# Patient Record
Sex: FEMALE | Race: WHITE | NOT HISPANIC OR LATINO | Employment: OTHER | URBAN - METROPOLITAN AREA
[De-identification: names, ages, dates, MRNs, and addresses within clinical notes are randomized per-mention and may not be internally consistent; named-entity substitution may affect disease eponyms.]

---

## 2021-05-23 ENCOUNTER — HOSPITAL ENCOUNTER (EMERGENCY)
Facility: HOSPITAL | Age: 56
Discharge: HOME | End: 2021-05-23
Attending: EMERGENCY MEDICINE | Admitting: EMERGENCY MEDICINE

## 2021-05-23 ENCOUNTER — APPOINTMENT (OUTPATIENT)
Dept: CT IMAGING | Facility: HOSPITAL | Age: 56
End: 2021-05-23

## 2021-05-23 VITALS
SYSTOLIC BLOOD PRESSURE: 134 MMHG | HEART RATE: 95 BPM | WEIGHT: 185 LBS | HEIGHT: 67 IN | TEMPERATURE: 98.8 F | BODY MASS INDEX: 29.03 KG/M2 | OXYGEN SATURATION: 96 % | RESPIRATION RATE: 16 BRPM | DIASTOLIC BLOOD PRESSURE: 66 MMHG

## 2021-05-23 DIAGNOSIS — K59.00 CONSTIPATION, UNSPECIFIED CONSTIPATION TYPE: Primary | ICD-10-CM

## 2021-05-23 DIAGNOSIS — K92.1 HEMATOCHEZIA: ICD-10-CM

## 2021-05-23 LAB
ABO GROUP BLD: NORMAL
ALBUMIN SERPL-MCNC: 4.4 G/DL (ref 3.5–5.2)
ALBUMIN/GLOB SERPL: 1.6 G/DL
ALP SERPL-CCNC: 76 U/L (ref 39–117)
ALT SERPL W P-5'-P-CCNC: 9 U/L (ref 1–33)
ANION GAP SERPL CALCULATED.3IONS-SCNC: 10.5 MMOL/L (ref 5–15)
AST SERPL-CCNC: 15 U/L (ref 1–32)
BACTERIA UR QL AUTO: ABNORMAL /HPF
BASOPHILS # BLD AUTO: 0.05 10*3/MM3 (ref 0–0.2)
BASOPHILS NFR BLD AUTO: 0.5 % (ref 0–1.5)
BILIRUB SERPL-MCNC: 0.2 MG/DL (ref 0–1.2)
BILIRUB UR QL STRIP: NEGATIVE
BLD GP AB SCN SERPL QL: NEGATIVE
BUN SERPL-MCNC: 24 MG/DL (ref 6–20)
BUN/CREAT SERPL: 15.6 (ref 7–25)
CALCIUM SPEC-SCNC: 9.3 MG/DL (ref 8.6–10.5)
CHLORIDE SERPL-SCNC: 103 MMOL/L (ref 98–107)
CLARITY UR: ABNORMAL
CO2 SERPL-SCNC: 22.5 MMOL/L (ref 22–29)
COLOR UR: YELLOW
CREAT SERPL-MCNC: 1.54 MG/DL (ref 0.57–1)
DEPRECATED RDW RBC AUTO: 46.1 FL (ref 37–54)
EOSINOPHIL # BLD AUTO: 0.34 10*3/MM3 (ref 0–0.4)
EOSINOPHIL NFR BLD AUTO: 3.1 % (ref 0.3–6.2)
ERYTHROCYTE [DISTWIDTH] IN BLOOD BY AUTOMATED COUNT: 13.1 % (ref 12.3–15.4)
GFR SERPL CREATININE-BSD FRML MDRD: 35 ML/MIN/1.73
GFR SERPL CREATININE-BSD FRML MDRD: 42 ML/MIN/1.73
GLOBULIN UR ELPH-MCNC: 2.8 GM/DL
GLUCOSE SERPL-MCNC: 148 MG/DL (ref 65–99)
GLUCOSE UR STRIP-MCNC: NEGATIVE MG/DL
HCT VFR BLD AUTO: 41.2 % (ref 34–46.6)
HGB BLD-MCNC: 13.7 G/DL (ref 12–15.9)
HGB UR QL STRIP.AUTO: ABNORMAL
HYALINE CASTS UR QL AUTO: ABNORMAL /LPF
IMM GRANULOCYTES # BLD AUTO: 0.05 10*3/MM3 (ref 0–0.05)
IMM GRANULOCYTES NFR BLD AUTO: 0.5 % (ref 0–0.5)
INR PPP: 0.94 (ref 0.9–1.1)
KETONES UR QL STRIP: NEGATIVE
LEUKOCYTE ESTERASE UR QL STRIP.AUTO: ABNORMAL
LIPASE SERPL-CCNC: 49 U/L (ref 13–60)
LYMPHOCYTES # BLD AUTO: 1.98 10*3/MM3 (ref 0.7–3.1)
LYMPHOCYTES NFR BLD AUTO: 18.3 % (ref 19.6–45.3)
MCH RBC QN AUTO: 31.9 PG (ref 26.6–33)
MCHC RBC AUTO-ENTMCNC: 33.3 G/DL (ref 31.5–35.7)
MCV RBC AUTO: 95.8 FL (ref 79–97)
MONOCYTES # BLD AUTO: 0.68 10*3/MM3 (ref 0.1–0.9)
MONOCYTES NFR BLD AUTO: 6.3 % (ref 5–12)
NEUTROPHILS NFR BLD AUTO: 7.74 10*3/MM3 (ref 1.7–7)
NEUTROPHILS NFR BLD AUTO: 71.3 % (ref 42.7–76)
NITRITE UR QL STRIP: NEGATIVE
NRBC BLD AUTO-RTO: 0 /100 WBC (ref 0–0.2)
PH UR STRIP.AUTO: <=5 [PH] (ref 5–8)
PLATELET # BLD AUTO: 280 10*3/MM3 (ref 140–450)
PMV BLD AUTO: 10.4 FL (ref 6–12)
POTASSIUM SERPL-SCNC: 4.8 MMOL/L (ref 3.5–5.2)
PROT SERPL-MCNC: 7.2 G/DL (ref 6–8.5)
PROT UR QL STRIP: NEGATIVE
PROTHROMBIN TIME: 12.4 SECONDS (ref 11.7–14.2)
RBC # BLD AUTO: 4.3 10*6/MM3 (ref 3.77–5.28)
RBC # UR: ABNORMAL /HPF
REF LAB TEST METHOD: ABNORMAL
RH BLD: POSITIVE
SODIUM SERPL-SCNC: 136 MMOL/L (ref 136–145)
SP GR UR STRIP: 1.01 (ref 1–1.03)
SQUAMOUS #/AREA URNS HPF: ABNORMAL /HPF
T&S EXPIRATION DATE: NORMAL
UROBILINOGEN UR QL STRIP: ABNORMAL
WBC # BLD AUTO: 10.84 10*3/MM3 (ref 3.4–10.8)
WBC UR QL AUTO: ABNORMAL /HPF

## 2021-05-23 PROCEDURE — 96375 TX/PRO/DX INJ NEW DRUG ADDON: CPT

## 2021-05-23 PROCEDURE — 85610 PROTHROMBIN TIME: CPT | Performed by: EMERGENCY MEDICINE

## 2021-05-23 PROCEDURE — 86850 RBC ANTIBODY SCREEN: CPT | Performed by: EMERGENCY MEDICINE

## 2021-05-23 PROCEDURE — 25010000002 IOPAMIDOL 61 % SOLUTION: Performed by: EMERGENCY MEDICINE

## 2021-05-23 PROCEDURE — 86900 BLOOD TYPING SEROLOGIC ABO: CPT | Performed by: EMERGENCY MEDICINE

## 2021-05-23 PROCEDURE — 25010000002 HYDROMORPHONE PER 4 MG: Performed by: EMERGENCY MEDICINE

## 2021-05-23 PROCEDURE — 96374 THER/PROPH/DIAG INJ IV PUSH: CPT

## 2021-05-23 PROCEDURE — 85025 COMPLETE CBC W/AUTO DIFF WBC: CPT | Performed by: EMERGENCY MEDICINE

## 2021-05-23 PROCEDURE — 74177 CT ABD & PELVIS W/CONTRAST: CPT

## 2021-05-23 PROCEDURE — 80053 COMPREHEN METABOLIC PANEL: CPT | Performed by: EMERGENCY MEDICINE

## 2021-05-23 PROCEDURE — 99284 EMERGENCY DEPT VISIT MOD MDM: CPT

## 2021-05-23 PROCEDURE — 81001 URINALYSIS AUTO W/SCOPE: CPT | Performed by: EMERGENCY MEDICINE

## 2021-05-23 PROCEDURE — 25010000002 ONDANSETRON PER 1 MG: Performed by: EMERGENCY MEDICINE

## 2021-05-23 PROCEDURE — 83690 ASSAY OF LIPASE: CPT | Performed by: EMERGENCY MEDICINE

## 2021-05-23 PROCEDURE — 86901 BLOOD TYPING SEROLOGIC RH(D): CPT | Performed by: EMERGENCY MEDICINE

## 2021-05-23 RX ORDER — HYDROMORPHONE HYDROCHLORIDE 1 MG/ML
0.5 INJECTION, SOLUTION INTRAMUSCULAR; INTRAVENOUS; SUBCUTANEOUS ONCE
Status: COMPLETED | OUTPATIENT
Start: 2021-05-23 | End: 2021-05-23

## 2021-05-23 RX ORDER — ONDANSETRON 2 MG/ML
4 INJECTION INTRAMUSCULAR; INTRAVENOUS ONCE
Status: COMPLETED | OUTPATIENT
Start: 2021-05-23 | End: 2021-05-23

## 2021-05-23 RX ORDER — SODIUM CHLORIDE 0.9 % (FLUSH) 0.9 %
10 SYRINGE (ML) INJECTION AS NEEDED
Status: DISCONTINUED | OUTPATIENT
Start: 2021-05-23 | End: 2021-05-23 | Stop reason: HOSPADM

## 2021-05-23 RX ADMIN — ONDANSETRON 4 MG: 2 INJECTION INTRAMUSCULAR; INTRAVENOUS at 18:48

## 2021-05-23 RX ADMIN — IOPAMIDOL 85 ML: 612 INJECTION, SOLUTION INTRAVENOUS at 19:55

## 2021-05-23 RX ADMIN — HYDROMORPHONE HYDROCHLORIDE 0.5 MG: 1 INJECTION, SOLUTION INTRAMUSCULAR; INTRAVENOUS; SUBCUTANEOUS at 18:48

## 2021-05-23 RX ADMIN — SODIUM CHLORIDE, POTASSIUM CHLORIDE, SODIUM LACTATE AND CALCIUM CHLORIDE 1000 ML: 600; 310; 30; 20 INJECTION, SOLUTION INTRAVENOUS at 18:48

## 2021-05-23 NOTE — ED NOTES
This RN wore mask, gloves, and protective eyewear throughout patient encounter. Pt wearing mask at all times. Hand hygiene performed before and after entering room.        Penny Deng, RN  05/23/21 9240

## 2022-04-18 ENCOUNTER — TRANSCRIBE ORDERS (OUTPATIENT)
Dept: ADMINISTRATIVE | Facility: HOSPITAL | Age: 57
End: 2022-04-18

## 2022-04-18 DIAGNOSIS — N25.9 IMPAIRED RENAL FUNCTION DISORDER: Primary | ICD-10-CM

## 2022-04-20 ENCOUNTER — HOSPITAL ENCOUNTER (OUTPATIENT)
Dept: ULTRASOUND IMAGING | Facility: HOSPITAL | Age: 57
Discharge: HOME | End: 2022-04-20
Admitting: NURSE PRACTITIONER

## 2022-04-20 DIAGNOSIS — N25.9 IMPAIRED RENAL FUNCTION DISORDER: ICD-10-CM

## 2022-04-20 PROCEDURE — 76775 US EXAM ABDO BACK WALL LIM: CPT

## 2022-04-25 ENCOUNTER — OFFICE VISIT (OUTPATIENT)
Dept: GASTROENTEROLOGY | Facility: CLINIC | Age: 57
End: 2022-04-25

## 2022-04-25 VITALS
BODY MASS INDEX: 31.55 KG/M2 | WEIGHT: 201 LBS | HEART RATE: 80 BPM | HEIGHT: 67 IN | DIASTOLIC BLOOD PRESSURE: 66 MMHG | TEMPERATURE: 97.5 F | SYSTOLIC BLOOD PRESSURE: 106 MMHG | OXYGEN SATURATION: 98 %

## 2022-04-25 DIAGNOSIS — Z86.010 HX OF COLONIC POLYPS: Primary | ICD-10-CM

## 2022-04-25 PROCEDURE — S0285 CNSLT BEFORE SCREEN COLONOSC: HCPCS | Performed by: NURSE PRACTITIONER

## 2022-04-25 RX ORDER — DULOXETIN HYDROCHLORIDE 30 MG/1
30 CAPSULE, DELAYED RELEASE ORAL NIGHTLY
COMMUNITY
Start: 2022-01-31

## 2022-04-25 RX ORDER — POLYETHYLENE GLYCOL 3350 17 G/17G
17 POWDER, FOR SOLUTION ORAL AS NEEDED
COMMUNITY

## 2022-04-25 RX ORDER — DULOXETIN HYDROCHLORIDE 60 MG/1
1 CAPSULE, DELAYED RELEASE ORAL DAILY
COMMUNITY
Start: 2022-01-31

## 2022-04-25 RX ORDER — LISINOPRIL 40 MG/1
40 TABLET ORAL DAILY
COMMUNITY

## 2022-04-25 RX ORDER — FENOFIBRATE 145 MG/1
145 TABLET, COATED ORAL DAILY
COMMUNITY

## 2022-04-25 RX ORDER — TRAMADOL HYDROCHLORIDE 50 MG/1
TABLET ORAL EVERY 8 HOURS PRN
COMMUNITY
Start: 2022-04-21

## 2022-04-25 RX ORDER — CARVEDILOL 6.25 MG/1
6.25 TABLET ORAL DAILY
COMMUNITY
Start: 2022-01-31

## 2022-04-25 RX ORDER — POLYETHYLENE GLYCOL 3350, SODIUM SULFATE ANHYDROUS, SODIUM BICARBONATE, SODIUM CHLORIDE, POTASSIUM CHLORIDE 236; 22.74; 6.74; 5.86; 2.97 G/4L; G/4L; G/4L; G/4L; G/4L
4 POWDER, FOR SOLUTION ORAL ONCE
Qty: 4000 ML | Refills: 0 | Status: SHIPPED | OUTPATIENT
Start: 2022-04-25 | End: 2022-04-25

## 2022-04-25 RX ORDER — TIZANIDINE 4 MG/1
TABLET ORAL EVERY 8 HOURS PRN
COMMUNITY
Start: 2022-04-21

## 2022-04-25 NOTE — PROGRESS NOTES
"Chief Complaint   Patient presents with   • Colon Cancer Screening     Pt presents today for evaluation for colonoscopy-pt's last colon was in 2017 in [place]-states it was normal/no polyps but she has had polyps in the past; Pt had colon resection for diverticulitis in 2017 after her colonoscopy-had 12\" removed     Subjective   HPI  Bettina Stevens is a 56 y.o. female who presents as a referral for preventative maintenance. She has no complaints of nausea or vomiting. No change in bowels. No wt loss. No BRBPR. No melena. There is no family hx for colon cancer. No abdominal pain. There was no Cologuard screening this year.  The patient's last colonoscopy was performed in 2017 in [place].  She states \"it was normal with no polyps but I have had polyps in the past.\"  The patient states that she is also had a history of colon resection due to diverticulitis in 2017 with 12 inches removed of my colon.\"     Past Medical History:   Diagnosis Date   • Aortic aneurysm (HCC)    • Diverticulosis    • History of colon polyps    • Hypertension    • Spinal stenosis    • Vitamin D deficiency      Past Surgical History:   Procedure Laterality Date   • ADENOIDECTOMY     • APPENDECTOMY     •  SECTION      X 2   • HEMICOLOECTOMY W/ ANASTOMOSIS  2017   • LAPAROTOMY OVARIAN CYSTECTOMY      X 2   • TONSILLECTOMY         Current Outpatient Medications:   •  carvedilol (COREG) 6.25 MG tablet, Take 6.25 mg by mouth 2 (Two) Times a Day., Disp: , Rfl:   •  DULoxetine (CYMBALTA) 30 MG capsule, Take 30 mg by mouth Every Night., Disp: , Rfl:   •  DULoxetine (CYMBALTA) 60 MG capsule, Take 1 capsule by mouth Daily., Disp: , Rfl:   •  fenofibrate (TRICOR) 145 MG tablet, Take 145 mg by mouth Daily., Disp: , Rfl:   •  lisinopril (PRINIVIL,ZESTRIL) 40 MG tablet, Take 40 mg by mouth Daily., Disp: , Rfl:   •  polyethylene glycol (MIRALAX) 17 GM/SCOOP powder, Take 17 g by mouth As Needed., Disp: , Rfl:   •  tiZANidine (ZANAFLEX) 4 MG tablet, Take 2 " "tablets by mouth Every 8 (Eight) Hours As Needed., Disp: , Rfl:   •  traMADol (ULTRAM) 50 MG tablet, Take 100 mg by mouth Every 8 (Eight) Hours As Needed., Disp: , Rfl:   •  polyethylene glycol (Golytely) 236 g solution, Take 4,000 mL by mouth 1 (One) Time for 1 dose. As directed by instructions provided at office, Disp: 4000 mL, Rfl: 0  Allergies   Allergen Reactions   • Rocephin [Ceftriaxone] Hives   • Sulfa Antibiotics Hives     Social History     Socioeconomic History   • Marital status:    Tobacco Use   • Smoking status: Current Every Day Smoker     Packs/day: 0.50     Types: Cigarettes   • Smokeless tobacco: Never Used   Vaping Use   • Vaping Use: Never used   Substance and Sexual Activity   • Alcohol use: Not Currently   • Drug use: Never   • Sexual activity: Defer     Family History   Problem Relation Age of Onset   • Colon cancer Neg Hx    • Colon polyps Neg Hx    • Esophageal cancer Neg Hx    • Liver cancer Neg Hx    • Liver disease Neg Hx    • Rectal cancer Neg Hx    • Stomach cancer Neg Hx        REVIEW OF SYSTEMS  General: well appearing, no fever chills or sweats, no unexplained wt loss  HEENT: no acute visual or hearing disturbances  Cardiovascular: No chest pain or palpitations  Pulmonary: No shortness of breath, coughing, wheezing or hemoptysis  : No burning, urgency, hematuria, or dysuria  Musculoskeletal: No joint pain or stiffness  Peripheral: no edema  Skin: No lesions or rashes  Neuro: No dizziness, headaches, stroke, syncope  Endocrine: No hot or cold intolerances  Hematological: No blood dyscrasias    Objective   Vitals:    04/25/22 1329   BP: 106/66   BP Location: Left arm   Patient Position: Sitting   Cuff Size: Adult   Pulse: 80   Temp: 97.5 °F (36.4 °C)   TempSrc: Infrared   SpO2: 98%   Weight: 91.2 kg (201 lb)   Height: 170.2 cm (67\")         PHYSICAL EXAM  General: age appropriate well nourished well appearing, no acute distress  Head: normocephalic and atraumatic  Global " assessment-supple  Neck-No JVD noted, no lymphadenopathy  Pulmonary-clear to auscultation bilaterally, normal respiratory effort  Cardiovascular-normal rate and rhythm, normal heart sounds, S1 and S2 noted  Abdomen-soft, non tender, non distended, normal bowel sounds all 4 quadrants, no hepatosplenomegaly noted  Extremities-No clubbing cyanosis or edema  Neuro-Non focal, converses appropriately, awake, alert, oriented    Lab Results - Last 18 Months   Lab Units 05/23/21  1605   GLUCOSE mg/dL 148*   BUN mg/dL 24*   CREATININE mg/dL 1.54*   SODIUM mmol/L 136   POTASSIUM mmol/L 4.8   CHLORIDE mmol/L 103   CO2 mmol/L 22.5   TOTAL PROTEIN g/dL 7.2   ALBUMIN g/dL 4.40   ALT (SGPT) U/L 9   AST (SGOT) U/L 15   ALK PHOS U/L 76   BILIRUBIN mg/dL 0.2   GLOBULIN gm/dL 2.8       Lab Results - Last 18 Months   Lab Units 05/23/21  1605   HEMOGLOBIN g/dL 13.7   HEMATOCRIT % 41.2   MCV fL 95.8   WBC 10*3/mm3 10.84*   RDW % 13.1   MPV fL 10.4   PLATELETS 10*3/mm3 280   INR  0.94         Imaging Results (Most Recent)     None        Assessment/Plan   Diagnoses and all orders for this visit:    1. Hx of colonic polyps (Primary)  -     Case Request; Standing  -     Case Request    Other orders  -     Follow Anesthesia Guidelines / Protocol; Future  -     Obtain Informed Consent; Future  -     polyethylene glycol (Golytely) 236 g solution; Take 4,000 mL by mouth 1 (One) Time for 1 dose. As directed by instructions provided at office  Dispense: 4000 mL; Refill: 0      COLONOSCOPY WITH ANESTHESIA (N/A)             All risks, benefits, alternatives, and indications of colonoscopy procedure have been discussed with the patient. Risks to include perforation of the colon requiring possible surgery or colostomy, risk of bleeding from biopsies or removal of colon tissue, possibility of missing a colon polyp or cancer, or adverse drug reaction.  Benefits to include the diagnosis and management of disease of the colon and rectum. Alternatives to  include barium enema, radiographic evaluation, lab testing or no intervention. Pt verbalizes understanding and agrees.

## 2022-04-25 NOTE — ED PROVIDER NOTES
EMERGENCY DEPARTMENT ENCOUNTER    Room Number:  15/15  Date of encounter:  2021  PCP: Provider, No Known  Historian: Patient      HPI:  Chief Complaint: Abdominal pain  A complete HPI/ROS/PMH/PSH/SH/FH are unobtainable due to: None    Context: Bettina Stevens is a 55 y.o. female who presents to the ED c/o abdominal pain.  This is periumbilical abdominal pain that began 2 days ago.  Pain is constant and cramping.  Nothing makes this better or worse.  She reports that 3 PM today she also had an episode of bright red blood in her stool.  No fever, vomiting, diarrhea.  She reports chronic constipation with some recently missed doses of MiraLAX.  She reports having associated nausea.  She has a history of diverticulitis status post colonic resection but still has remaining diverticular disease.      PAST MEDICAL HISTORY  Active Ambulatory Problems     Diagnosis Date Noted   • No Active Ambulatory Problems     Resolved Ambulatory Problems     Diagnosis Date Noted   • No Resolved Ambulatory Problems     Past Medical History:   Diagnosis Date   • Diverticulosis    • Spinal stenosis          PAST SURGICAL HISTORY  Past Surgical History:   Procedure Laterality Date   • ADENOIDECTOMY     • APPENDECTOMY     •  SECTION     • HEMICOLOECTOMY W/ ANASTOMOSIS     • TONSILLECTOMY           FAMILY HISTORY  History reviewed. No pertinent family history.      SOCIAL HISTORY  Social History     Socioeconomic History   • Marital status:      Spouse name: Not on file   • Number of children: Not on file   • Years of education: Not on file   • Highest education level: Not on file   Tobacco Use   • Smoking status: Current Every Day Smoker   Substance and Sexual Activity   • Alcohol use: Never   • Drug use: Never         ALLERGIES  Rocephin [ceftriaxone] and Sulfa antibiotics        REVIEW OF SYSTEMS  Review of Systems     All systems reviewed and negative except for those discussed in HPI.       PHYSICAL EXAM    I have  [facility]   Outpatient Physical Therapy   Treatment Note               [x] [facility]                of [address]    Date: 2022  Patient: Ag Hayward  : 2001   Confirmed: Yes  MRN: [id number]  Referring Provider: Lisa Mckay APR*   Secondary Referring Provider (If applicable):     Medical Diagnosis: Pain in left hip [M25.552]  Other chronic pain [G89.29] hip pain, chronic left  Treatment Diagnosis: decreased hip ROM, decreased hip strength, decreased activity tolerance for running and amb prolonged distances >1 mile and increased pain L anterior hip with transferring off the floor    Visit Information:  Insurance: Payor: Jayashree Ames / Plan: Leandro Hernandez / Product Type: *No Product type* /   PT Visit Information  PT Insurance Information: [employer]  Total # of Visits Approved: 8 (8+8)  Plan of Care/Certification Expiration Date: 22  Canceled Appointment: 4  Progress Note Counter:  (24 new units 3/28-22)    Subjective Information:  Subjective: pt reports that  said back pain in sciatica and has her on anti inflamatories. HEP Compliance:  [x] Good [] Fair [] Poor [] Reports not doing due to:    Pain Screening  Patient's Stated Pain Goal: 0 - No pain  Pain Location: Hip  Pain Orientation: Left    Treatment:  Exercises:  Exercises  Exercise 2: [id number] x10   Exercise 3: ytb er and ir x10  Exercise 5: hip flexor stretch 3x30 with belt  Exercise 6: piriformis stretch 3x30 sec   Exercise 7: hamstring stretch step 30s x3  Exercise 9: bridge x15, bridge with heel raise to increase glut engagement x15  Exercise 11: sKTC 3 x30 sec        Assessment: Body Structures, Functions, Activity Limitations Requiring Skilled Therapeutic Intervention: Decreased functional mobility ,Decreased endurance,Decreased ROM,Increased pain,Decreased strength  Assessment: Pt with some difficulty with er/ir with tband this date. Pt with good alberto to stretches. Treatment Diagnosis: decreased hip ROM, decreased hip strength, decreased activity tolerance for running and amb prolonged distances >1 mile and increased pain L anterior hip with transferring off the floor    Post-Pain Assessment:       Pain Rating (0-10 pain scale):   /10   Location and pain description same as pre-treatment unless indicated. Action: [] NA   [] Perform HEP  [] Meds as prescribed  [] Modalities as prescribed   [] Call Physician     GOALS   Patient Goal(s):      Short Term Goals Completed by 2 wks Goal Status   Pt will demonstrate improved L hip AROM WNL for carryover to decreased anterior hip pain. in progress                                Long Term Goals Completed by 6 wks Goal Status   Pt will demonstrate indep and compliance with % of the time for self management of L hip pain. In progress   Pt will demonstrate improved score on LEFS >/= 75/80 for improved pt quality of life. In progress   The pt will report decreased pain </=1/10 at worst in order to increase ease with running and amb > 1 mile and decresed pain with transfers off the floor. In progress   Patient will increase strength in LT LE to 5/5 for improved functional tolerance. In progress                       Plan:  Frequency/Duration:  Plan  Plan weeks: 4-6  Pt to continue current HEP. See objective section for any therapeutic exercise changes, additions or modifications this date.     Therapy Time:      PT Individual Minutes  Time In: 1600  Time Out: 0751  Minutes: 45  Timed Code Treatment Minutes: 45 Minutes  Procedure Minutes:  Timed Activity Minutes Units   Ther Ex 45 3     Electronically signed by:   Gillian Maki PTA  Date: 4/26/2022 reviewed the triage vital signs and nursing notes.    ED Triage Vitals   Temp Heart Rate Resp BP SpO2   05/23/21 1650 05/23/21 1650 05/23/21 1650 05/23/21 1701 05/23/21 1650   98.8 °F (37.1 °C) 110 16 119/84 98 %      Temp src Heart Rate Source Patient Position BP Location FiO2 (%)   05/23/21 1650 -- -- -- --   Tympanic           Physical Exam  GENERAL: not distressed  HENT: nares patent  EYES: no scleral icterus  CV: regular rhythm, regular rate  RESPIRATORY: normal effort, clear to auscultation bilaterally  ABDOMEN: soft, periumbilical abdominal tenderness without rebound or guarding, rectal exam shows no external hemorrhoids.  She does have bright red blood in her stool.  No palpable hard stool in the rectal vault  MUSCULOSKELETAL: no deformity  NEURO: alert, moves all extremities, follows commands  SKIN: warm, dry        LAB RESULTS  Recent Results (from the past 24 hour(s))   Comprehensive Metabolic Panel    Collection Time: 05/23/21  5:05 PM    Specimen: Blood   Result Value Ref Range    Glucose 148 (H) 65 - 99 mg/dL    BUN 24 (H) 6 - 20 mg/dL    Creatinine 1.54 (H) 0.57 - 1.00 mg/dL    Sodium 136 136 - 145 mmol/L    Potassium 4.8 3.5 - 5.2 mmol/L    Chloride 103 98 - 107 mmol/L    CO2 22.5 22.0 - 29.0 mmol/L    Calcium 9.3 8.6 - 10.5 mg/dL    Total Protein 7.2 6.0 - 8.5 g/dL    Albumin 4.40 3.50 - 5.20 g/dL    ALT (SGPT) 9 1 - 33 U/L    AST (SGOT) 15 1 - 32 U/L    Alkaline Phosphatase 76 39 - 117 U/L    Total Bilirubin 0.2 0.0 - 1.2 mg/dL    eGFR Non African Amer 35 (L) >60 mL/min/1.73    eGFR  African Amer 42 (L) >60 mL/min/1.73    Globulin 2.8 gm/dL    A/G Ratio 1.6 g/dL    BUN/Creatinine Ratio 15.6 7.0 - 25.0    Anion Gap 10.5 5.0 - 15.0 mmol/L   Protime-INR    Collection Time: 05/23/21  5:05 PM    Specimen: Blood   Result Value Ref Range    Protime 12.4 11.7 - 14.2 Seconds    INR 0.94 0.90 - 1.10   Lipase    Collection Time: 05/23/21  5:05 PM    Specimen: Blood   Result Value Ref Range    Lipase 49 13 -  60 U/L   Urinalysis With Microscopic If Indicated (No Culture) - Urine, Clean Catch    Collection Time: 05/23/21  5:05 PM    Specimen: Urine, Clean Catch   Result Value Ref Range    Color, UA Yellow Yellow, Straw    Appearance, UA Cloudy (A) Clear    pH, UA <=5.0 5.0 - 8.0    Specific Gravity, UA 1.015 1.005 - 1.030    Glucose, UA Negative Negative    Ketones, UA Negative Negative    Bilirubin, UA Negative Negative    Blood, UA Small (1+) (A) Negative    Protein, UA Negative Negative    Leuk Esterase, UA Small (1+) (A) Negative    Nitrite, UA Negative Negative    Urobilinogen, UA 0.2 E.U./dL 0.2 - 1.0 E.U./dL   CBC Auto Differential    Collection Time: 05/23/21  5:05 PM    Specimen: Blood   Result Value Ref Range    WBC 10.84 (H) 3.40 - 10.80 10*3/mm3    RBC 4.30 3.77 - 5.28 10*6/mm3    Hemoglobin 13.7 12.0 - 15.9 g/dL    Hematocrit 41.2 34.0 - 46.6 %    MCV 95.8 79.0 - 97.0 fL    MCH 31.9 26.6 - 33.0 pg    MCHC 33.3 31.5 - 35.7 g/dL    RDW 13.1 12.3 - 15.4 %    RDW-SD 46.1 37.0 - 54.0 fl    MPV 10.4 6.0 - 12.0 fL    Platelets 280 140 - 450 10*3/mm3    Neutrophil % 71.3 42.7 - 76.0 %    Lymphocyte % 18.3 (L) 19.6 - 45.3 %    Monocyte % 6.3 5.0 - 12.0 %    Eosinophil % 3.1 0.3 - 6.2 %    Basophil % 0.5 0.0 - 1.5 %    Immature Grans % 0.5 0.0 - 0.5 %    Neutrophils, Absolute 7.74 (H) 1.70 - 7.00 10*3/mm3    Lymphocytes, Absolute 1.98 0.70 - 3.10 10*3/mm3    Monocytes, Absolute 0.68 0.10 - 0.90 10*3/mm3    Eosinophils, Absolute 0.34 0.00 - 0.40 10*3/mm3    Basophils, Absolute 0.05 0.00 - 0.20 10*3/mm3    Immature Grans, Absolute 0.05 0.00 - 0.05 10*3/mm3    nRBC 0.0 0.0 - 0.2 /100 WBC   Urinalysis, Microscopic Only - Urine, Clean Catch    Collection Time: 05/23/21  5:05 PM    Specimen: Urine, Clean Catch   Result Value Ref Range    RBC, UA 3-5 (A) None Seen, 0-2 /HPF    WBC, UA 21-30 (A) None Seen, 0-2 /HPF    Bacteria, UA 4+ (A) None Seen /HPF    Squamous Epithelial Cells, UA 13-20 (A) None Seen, 0-2 /HPF    Hyaline  Casts, UA 3-6 None Seen /LPF    Methodology Automated Microscopy    Type & Screen    Collection Time: 05/23/21  5:34 PM    Specimen: Blood   Result Value Ref Range    ABO Type O     RH type Positive     Antibody Screen Negative     T&S Expiration Date 5/26/2021 11:59:59 PM        Ordered the above labs and independently reviewed the results.        RADIOLOGY  CT Abdomen Pelvis With Contrast    Result Date: 5/23/2021  Patient: PRINCESS SCHWARZ  Time Out: 20:45 Exam(s): CT ABDOMEN + PELVIS With Contrast EXAM:   CT Abdomen and Pelvis With Intravenous Contrast CLINICAL HISTORY:    periumbilical abd pain  Reason for exam: periumbilical abd pain. TECHNIQUE:   Axial computed tomography images of the abdomen and pelvis with intravenous contrast.  CTDI is 13.3 mGy and DLP is 731 mGy-cm.  This CT exam was performed according to the principle of ALARA (As Low As Reasonably Achievable) by using one or more of the following dose reduction techniques: automated exposure control, adjustment of the mA and or kV according to patient size, and or use of iterative reconstruction technique. COMPARISON:   No relevant prior studies available. FINDINGS:   Lung bases:  Unremarkable.  ABDOMEN:   Liver:  Unremarkable.   Gallbladder and bile ducts:  Unremarkable.  No calcified stones.   Pancreas:  Unremarkable.   Spleen:  Unremarkable.   Adrenals:  Unremarkable.   Kidneys and ureters:  Unremarkable.  No hydronephrosis.   Stomach and bowel:  Moderate colonic stool which may be ileus constipation.  No mechanical bowel obstruction.  Colonic diverticulosis.  No diverticulitis.  PELVIS:   Appendix:  No findings to suggest acute appendicitis.   Bladder:  Distended bladder.   Reproductive:  Unremarkable as visualized.  ABDOMEN and PELVIS:   Intraperitoneal space:  No free air.   Bones joints:  No acute fracture.   Soft tissues:  Small umbilical fat hernia.   Vasculature:  Atherosclerosis and aortic ulcerations.  4 cm suprarenal AAA, no rupture.   Lymph  nodes:  Unremarkable. IMPRESSION:     1.  Moderate colonic stool which may be ileus constipation.  No mechanical bowel obstruction.  Colonic diverticulosis.  No diverticulitis. 2.  Atherosclerosis and aortic ulcerations.  4 cm suprarenal AAA, no rupture.     Electronically signed by Ace Ortiz M.D. on 05-23-21 at 2045      I ordered the above noted radiological studies. Reviewed by me and discussed with radiologist.  See dictation for official radiology interpretation.      PROCEDURES    Procedures      MEDICATIONS GIVEN IN ER    Medications   sodium chloride 0.9 % flush 10 mL (has no administration in time range)   ondansetron (ZOFRAN) injection 4 mg (4 mg Intravenous Given 5/23/21 1848)   HYDROmorphone (DILAUDID) injection 0.5 mg (0.5 mg Intravenous Given 5/23/21 1848)   lactated ringers bolus 1,000 mL (1,000 mL Intravenous New Bag 5/23/21 1848)   iopamidol (ISOVUE-300) 61 % injection 100 mL (85 mL Intravenous Given by Other 5/23/21 1955)         PROGRESS, DATA ANALYSIS, CONSULTS, AND MEDICAL DECISION MAKING    All labs have been independently reviewed by me.  All radiology studies have been reviewed by me and discussed with radiologist dictating the report.   EKG's independently viewed and interpreted by me.  Discussion below represents my analysis of pertinent findings related to patient's condition, differential diagnosis, treatment plan and final disposition.    Differential diagnosis includes but not limited to:  - hepatobiliary pathology such as cholecystitis, cholangitis, and symptomatic cholelithiasis  - Pancreatitis  - Dyspepsia  - Small bowel obstruction  - Appendicitis  - Diverticulitis  - UTI including pyelonephritis  - Ureteral stone  - Zoster  - Colitis, including infectious and ischemic      ED Course as of May 23 2107   Sun May 23, 2021   1746 Lipase: 49 [TD]   1747 Creatinine(!): 1.54 [TD]   1747 Bacteria, UA(!): 4+ [TD]   1747 Squamous Epithelial Cells, UA(!): 13-20 [TD]   1747 WBC, UA(!):  21-30 [TD]   1747 WBC(!): 10.84 [TD]   1747 Hemoglobin: 13.7 [TD]      ED Course User Index  [TD] Gabe Costa II, MD       CT scan shows no acute intra-abdominal findings.  She does have moderate stool burden.    Patient has normal hemoglobin.  I suspect the bleeding is either from an internal hemorrhoid or diverticular bleed.  She is on no anticoagulants.  I think that she is safe to be discharged from hematochezia standpoint.    Additionally, recommend that she be particularly consistent with her MiraLAX given the increase stool burden.  However, her repeat abdominal exam shows no evidence of peritonitis.  I also believe that she is safe for discharge home from the standpoint as well.    PPE: Both the patient and I wore a surgical mask throughout the entire patient encounter. I wore protective goggles.     AS OF 21:07 EDT VITALS:    BP - 140/81  HR - 83  TEMP - 98.8 °F (37.1 °C) (Tympanic)  O2 SATS - 95%        DIAGNOSIS  Final diagnoses:   Constipation, unspecified constipation type   Hematochezia         DISPOSITION  DISCHARGE    FOLLOW-UP  [facility] Emergency Department  [address] [facility] [address] [phone]  Go to   If symptoms worsen         Medication List      No changes were made to your prescriptions during this visit.                  Gabe Costa II, MD  05/23/21 1458

## 2022-04-26 PROBLEM — Z86.010 HX OF COLONIC POLYPS: Status: ACTIVE | Noted: 2022-04-26

## 2022-05-13 ENCOUNTER — TELEPHONE (OUTPATIENT)
Dept: GASTROENTEROLOGY | Facility: CLINIC | Age: 57
End: 2022-05-13

## 2022-05-13 NOTE — TELEPHONE ENCOUNTER
Pt called to cx her colonoscopy on 6-6 and did not want to r/s at this time. Placing pt in recall.

## 2022-11-18 ENCOUNTER — TELEPHONE (OUTPATIENT)
Dept: GASTROENTEROLOGY | Facility: CLINIC | Age: 57
End: 2022-11-18

## 2022-11-18 NOTE — TELEPHONE ENCOUNTER
I have sent 2 letters to pt re: recall colon and have had no response. I called and spoke to her about it today-she tells me she had to cancel due to an insurance issue. She still has that same insurance so she still can't have the procedure. Pt is aware to call office back if/when she is ready to proceed. I am removing pt from my recall list.

## 2022-12-03 ENCOUNTER — APPOINTMENT (OUTPATIENT)
Dept: CT IMAGING | Facility: HOSPITAL | Age: 57
End: 2022-12-03

## 2022-12-03 ENCOUNTER — APPOINTMENT (OUTPATIENT)
Dept: GENERAL RADIOLOGY | Facility: HOSPITAL | Age: 57
End: 2022-12-03

## 2022-12-03 ENCOUNTER — HOSPITAL ENCOUNTER (OUTPATIENT)
Facility: HOSPITAL | Age: 57
Setting detail: OBSERVATION
Discharge: HOME | End: 2022-12-06
Attending: FAMILY MEDICINE | Admitting: FAMILY MEDICINE

## 2022-12-03 DIAGNOSIS — R74.8 ELEVATED LIPASE: ICD-10-CM

## 2022-12-03 DIAGNOSIS — R10.84 GENERALIZED ABDOMINAL PAIN: Primary | ICD-10-CM

## 2022-12-03 LAB
ALBUMIN SERPL-MCNC: 4.8 G/DL (ref 3.5–5.2)
ALBUMIN/GLOB SERPL: 1.8 G/DL
ALP SERPL-CCNC: 41 U/L (ref 39–117)
ALT SERPL W P-5'-P-CCNC: 6 U/L (ref 1–33)
ANION GAP SERPL CALCULATED.3IONS-SCNC: 10 MMOL/L (ref 5–15)
AST SERPL-CCNC: 18 U/L (ref 1–32)
BASOPHILS # BLD AUTO: 0.03 10*3/MM3 (ref 0–0.2)
BASOPHILS NFR BLD AUTO: 0.3 % (ref 0–1.5)
BILIRUB SERPL-MCNC: 0.2 MG/DL (ref 0–1.2)
BUN SERPL-MCNC: 28 MG/DL (ref 6–20)
BUN/CREAT SERPL: 13.9 (ref 7–25)
CALCIUM SPEC-SCNC: 9.9 MG/DL (ref 8.6–10.5)
CHLORIDE SERPL-SCNC: 101 MMOL/L (ref 98–107)
CO2 SERPL-SCNC: 26 MMOL/L (ref 22–29)
CREAT SERPL-MCNC: 2.01 MG/DL (ref 0.57–1)
D DIMER PPP FEU-MCNC: 1.96 MCGFEU/ML (ref 0–0.59)
DEPRECATED RDW RBC AUTO: 42.6 FL (ref 37–54)
EGFRCR SERPLBLD CKD-EPI 2021: 28.1 ML/MIN/1.73
EOSINOPHIL # BLD AUTO: 0.27 10*3/MM3 (ref 0–0.4)
EOSINOPHIL NFR BLD AUTO: 2.7 % (ref 0.3–6.2)
ERYTHROCYTE [DISTWIDTH] IN BLOOD BY AUTOMATED COUNT: 12.2 % (ref 12.3–15.4)
GLOBULIN UR ELPH-MCNC: 2.6 GM/DL
GLUCOSE SERPL-MCNC: 91 MG/DL (ref 65–99)
HCT VFR BLD AUTO: 41.4 % (ref 34–46.6)
HGB BLD-MCNC: 13.2 G/DL (ref 12–15.9)
IMM GRANULOCYTES # BLD AUTO: 0.02 10*3/MM3 (ref 0–0.05)
IMM GRANULOCYTES NFR BLD AUTO: 0.2 % (ref 0–0.5)
LIPASE SERPL-CCNC: 288 U/L (ref 13–60)
LYMPHOCYTES # BLD AUTO: 2.03 10*3/MM3 (ref 0.7–3.1)
LYMPHOCYTES NFR BLD AUTO: 20.5 % (ref 19.6–45.3)
MAGNESIUM SERPL-MCNC: 2.3 MG/DL (ref 1.6–2.6)
MCH RBC QN AUTO: 30.2 PG (ref 26.6–33)
MCHC RBC AUTO-ENTMCNC: 31.9 G/DL (ref 31.5–35.7)
MCV RBC AUTO: 94.7 FL (ref 79–97)
MONOCYTES # BLD AUTO: 0.82 10*3/MM3 (ref 0.1–0.9)
MONOCYTES NFR BLD AUTO: 8.3 % (ref 5–12)
NEUTROPHILS NFR BLD AUTO: 6.75 10*3/MM3 (ref 1.7–7)
NEUTROPHILS NFR BLD AUTO: 68 % (ref 42.7–76)
NRBC BLD AUTO-RTO: 0 /100 WBC (ref 0–0.2)
PLATELET # BLD AUTO: 293 10*3/MM3 (ref 140–450)
PMV BLD AUTO: 10.6 FL (ref 6–12)
POTASSIUM SERPL-SCNC: 4.5 MMOL/L (ref 3.5–5.2)
PROT SERPL-MCNC: 7.4 G/DL (ref 6–8.5)
RBC # BLD AUTO: 4.37 10*6/MM3 (ref 3.77–5.28)
SODIUM SERPL-SCNC: 137 MMOL/L (ref 136–145)
TROPONIN T SERPL-MCNC: <0.01 NG/ML (ref 0–0.03)
WBC NRBC COR # BLD: 9.92 10*3/MM3 (ref 3.4–10.8)

## 2022-12-03 PROCEDURE — 84484 ASSAY OF TROPONIN QUANT: CPT | Performed by: FAMILY MEDICINE

## 2022-12-03 PROCEDURE — 71045 X-RAY EXAM CHEST 1 VIEW: CPT

## 2022-12-03 PROCEDURE — 96374 THER/PROPH/DIAG INJ IV PUSH: CPT

## 2022-12-03 PROCEDURE — 99284 EMERGENCY DEPT VISIT MOD MDM: CPT

## 2022-12-03 PROCEDURE — 25010000002 ONDANSETRON PER 1 MG: Performed by: FAMILY MEDICINE

## 2022-12-03 PROCEDURE — 85025 COMPLETE CBC W/AUTO DIFF WBC: CPT | Performed by: FAMILY MEDICINE

## 2022-12-03 PROCEDURE — 96376 TX/PRO/DX INJ SAME DRUG ADON: CPT

## 2022-12-03 PROCEDURE — 80053 COMPREHEN METABOLIC PANEL: CPT | Performed by: FAMILY MEDICINE

## 2022-12-03 PROCEDURE — 83690 ASSAY OF LIPASE: CPT | Performed by: FAMILY MEDICINE

## 2022-12-03 PROCEDURE — 93005 ELECTROCARDIOGRAM TRACING: CPT

## 2022-12-03 PROCEDURE — 83735 ASSAY OF MAGNESIUM: CPT | Performed by: FAMILY MEDICINE

## 2022-12-03 PROCEDURE — 93010 ELECTROCARDIOGRAM REPORT: CPT | Performed by: INTERNAL MEDICINE

## 2022-12-03 PROCEDURE — 85379 FIBRIN DEGRADATION QUANT: CPT | Performed by: FAMILY MEDICINE

## 2022-12-03 RX ORDER — ONDANSETRON 2 MG/ML
4 INJECTION INTRAMUSCULAR; INTRAVENOUS ONCE
Status: COMPLETED | OUTPATIENT
Start: 2022-12-03 | End: 2022-12-03

## 2022-12-03 RX ORDER — SODIUM CHLORIDE 0.9 % (FLUSH) 0.9 %
10 SYRINGE (ML) INJECTION AS NEEDED
Status: DISCONTINUED | OUTPATIENT
Start: 2022-12-03 | End: 2022-12-06 | Stop reason: HOSPADM

## 2022-12-03 RX ADMIN — SODIUM CHLORIDE 1000 ML: 9 INJECTION, SOLUTION INTRAVENOUS at 21:56

## 2022-12-03 RX ADMIN — ONDANSETRON 4 MG: 2 INJECTION INTRAMUSCULAR; INTRAVENOUS at 21:56

## 2022-12-03 RX ADMIN — ONDANSETRON 4 MG: 2 INJECTION INTRAMUSCULAR; INTRAVENOUS at 18:43

## 2022-12-03 NOTE — ED PROVIDER NOTES
Subjective   History of Present Illness  Patient reports having epigastric abdominal pain going on since yesterday.  Patient states that she is also felt nauseous.  Patient reports radiation of the epigastric pain to bilateral ribs on the right and left side.  Patient denies any chest pain or shortness of breath.  Patient states that she has no episodes of vomiting.  Patient denies any diarrhea.  Patient denies any bloody stools.  Patient denies any black tarry stools.  Patient denies any urinary symptoms.  Patient denies any fevers or chills.  She reports that the pain is a bloating type of pain.  Patient states that the pressure pain is more than just the actual pain.        Review of Systems   Gastrointestinal: Positive for abdominal pain and nausea.   All other systems reviewed and are negative.      Past Medical History:   Diagnosis Date   • Aortic aneurysm (HCC)    • Diverticulosis    • History of colon polyps    • Hypertension    • Spinal stenosis    • Vitamin D deficiency        Allergies   Allergen Reactions   • Rocephin [Ceftriaxone] Hives   • Sulfa Antibiotics Hives       Past Surgical History:   Procedure Laterality Date   • ADENOIDECTOMY     • APPENDECTOMY     •  SECTION      X 2   • HEMICOLOECTOMY W/ ANASTOMOSIS  2017   • LAPAROTOMY OVARIAN CYSTECTOMY      X 2   • TONSILLECTOMY         Family History   Problem Relation Age of Onset   • Colon cancer Neg Hx    • Colon polyps Neg Hx    • Esophageal cancer Neg Hx    • Liver cancer Neg Hx    • Liver disease Neg Hx    • Rectal cancer Neg Hx    • Stomach cancer Neg Hx        Social History     Socioeconomic History   • Marital status:    Tobacco Use   • Smoking status: Every Day     Packs/day: 0.50     Types: Cigarettes   • Smokeless tobacco: Never   Vaping Use   • Vaping Use: Never used   Substance and Sexual Activity   • Alcohol use: Not Currently   • Drug use: Never   • Sexual activity: Defer           Objective   Physical Exam  Vitals and  nursing note reviewed.   Constitutional:       General: She is not in acute distress.     Appearance: She is well-developed. She is not toxic-appearing.   HENT:      Head: Normocephalic and atraumatic.      Mouth/Throat:      Mouth: Mucous membranes are moist.   Cardiovascular:      Rate and Rhythm: Normal rate and regular rhythm.   Pulmonary:      Effort: Pulmonary effort is normal.      Breath sounds: Normal breath sounds.   Chest:      Chest wall: No tenderness.   Abdominal:      General: Bowel sounds are normal. There is no distension.      Palpations: Abdomen is soft.      Tenderness: There is abdominal tenderness in the epigastric area.   Skin:     General: Skin is warm and dry.   Neurological:      General: No focal deficit present.      Mental Status: She is alert and oriented to person, place, and time.   Psychiatric:         Mood and Affect: Mood normal.         Procedures           ED Course  ED Course as of 12/05/22 0119   Sat Dec 03, 2022   2134 Creatinine(!): 2.01 [RP]   Sun Dec 04, 2022   0600 Patient scans not show any acute intra-abdominal pathologies.  She does have a slight aortic aneurysm that is nonruptured.  She does have evidence of a mildly elevated lipase which is consistent with acute pancreatitis.  She was admitted in stable condition for pain control and further fluids [NP]      ED Course User Index  [NP] Matthew Gaona MD  [RP] Dao Booth MD                                         Lab Results (last 24 hours)     Procedure Component Value Units Date/Time    CBC Auto Differential [[id number]]  (Abnormal) Collected: 12/04/22 0544    Specimen: Blood Updated: 12/04/22 0552     WBC 8.42 10*3/mm3      RBC 4.14 10*6/mm3      Hemoglobin 12.5 g/dL      Hematocrit 39.2 %      MCV 94.7 fL      MCH 30.2 pg      MCHC 31.9 g/dL      RDW 12.2 %      RDW-SD 42.5 fl      MPV 10.5 fL      Platelets 273 10*3/mm3      Neutrophil % 55.8 %      Lymphocyte % 30.6 %      Monocyte % 8.8 %      Eosinophil % 3.7  %      Basophil % 0.5 %      Immature Grans % 0.6 %      Neutrophils, Absolute 4.70 10*3/mm3      Lymphocytes, Absolute 2.58 10*3/mm3      Monocytes, Absolute 0.74 10*3/mm3      Eosinophils, Absolute 0.31 10*3/mm3      Basophils, Absolute 0.04 10*3/mm3      Immature Grans, Absolute 0.05 10*3/mm3      nRBC 0.0 /100 WBC     Comprehensive Metabolic Panel [[id number]]  (Abnormal) Collected: 12/04/22 0544    Specimen: Blood Updated: 12/04/22 0612     Glucose 91 mg/dL      BUN 23 mg/dL      Creatinine 1.82 mg/dL      Sodium 141 mmol/L      Potassium 3.9 mmol/L      Chloride 104 mmol/L      CO2 26.0 mmol/L      Calcium 9.4 mg/dL      Total Protein 6.7 g/dL      Albumin 4.10 g/dL      ALT (SGPT) 6 U/L      AST (SGOT) 17 U/L      Alkaline Phosphatase 40 U/L      Total Bilirubin 0.3 mg/dL      Globulin 2.6 gm/dL      A/G Ratio 1.6 g/dL      BUN/Creatinine Ratio 12.6     Anion Gap 11.0 mmol/L      eGFR 31.7 mL/min/1.73      Comment: National Kidney Foundation and American Society of Nephrology (ASN) Task Force recommended calculation based on the Chronic Kidney Disease Epidemiology Collaboration (CKD-EPI) equation refit without adjustment for race.       Narrative:      GFR Normal >60  Chronic Kidney Disease <60  Kidney Failure <15      Phosphorus [[id number]]  (Normal) Collected: 12/04/22 0544    Specimen: Blood Updated: 12/04/22 0612     Phosphorus 3.1 mg/dL     Amylase [[id number]]  (Abnormal) Collected: 12/04/22 0544    Specimen: Blood Updated: 12/04/22 0612     Amylase 145 U/L     Sedimentation Rate [[id number]]  (Normal) Collected: 12/04/22 0544    Specimen: Blood Updated: 12/04/22 0607     Sed Rate 11 mm/hr     Lipase [[id number]]  (Abnormal) Collected: 12/04/22 0544    Specimen: Blood Updated: 12/04/22 0612     Lipase 287 U/L           MDM    EKG rate 76  Normal sinus rhythm  No ST changes    2150 - Patient care transitioned to Dr. Gaona    Final diagnoses:   Generalized abdominal pain   Elevated lipase       ED  Disposition  ED Disposition     ED Disposition   Decision to Admit    Condition   --    Comment   Level of Care: Med/Surg [1]   Diagnosis: Generalized abdominal pain [771134]   Admitting Physician: LAURY RHOADES [[id number]]   Attending Physician: LAURY RHOADES [[id number]]               No follow-up provider specified.       Medication List      No changes were made to your prescriptions during this visit.          Matthew Gaona MD  12/05/22 0119

## 2022-12-04 ENCOUNTER — APPOINTMENT (OUTPATIENT)
Dept: CT IMAGING | Facility: HOSPITAL | Age: 57
End: 2022-12-04

## 2022-12-04 ENCOUNTER — APPOINTMENT (OUTPATIENT)
Dept: ULTRASOUND IMAGING | Facility: HOSPITAL | Age: 57
End: 2022-12-04

## 2022-12-04 PROBLEM — R10.84 GENERALIZED ABDOMINAL PAIN: Status: ACTIVE | Noted: 2022-12-04

## 2022-12-04 PROBLEM — K85.90 ACUTE PANCREATITIS: Status: ACTIVE | Noted: 2022-12-04

## 2022-12-04 LAB
ALBUMIN SERPL-MCNC: 4.1 G/DL (ref 3.5–5.2)
ALBUMIN/GLOB SERPL: 1.6 G/DL
ALP SERPL-CCNC: 40 U/L (ref 39–117)
ALT SERPL W P-5'-P-CCNC: 6 U/L (ref 1–33)
AMYLASE SERPL-CCNC: 145 U/L (ref 28–100)
ANION GAP SERPL CALCULATED.3IONS-SCNC: 11 MMOL/L (ref 5–15)
AST SERPL-CCNC: 17 U/L (ref 1–32)
BASOPHILS # BLD AUTO: 0.04 10*3/MM3 (ref 0–0.2)
BASOPHILS NFR BLD AUTO: 0.5 % (ref 0–1.5)
BILIRUB SERPL-MCNC: 0.3 MG/DL (ref 0–1.2)
BUN SERPL-MCNC: 23 MG/DL (ref 6–20)
BUN/CREAT SERPL: 12.6 (ref 7–25)
CALCIUM SPEC-SCNC: 9.4 MG/DL (ref 8.6–10.5)
CHLORIDE SERPL-SCNC: 104 MMOL/L (ref 98–107)
CO2 SERPL-SCNC: 26 MMOL/L (ref 22–29)
CREAT BLDA-MCNC: 1.9 MG/DL (ref 0.6–1.3)
CREAT SERPL-MCNC: 1.82 MG/DL (ref 0.57–1)
DEPRECATED RDW RBC AUTO: 42.5 FL (ref 37–54)
EGFRCR SERPLBLD CKD-EPI 2021: 31.7 ML/MIN/1.73
EOSINOPHIL # BLD AUTO: 0.31 10*3/MM3 (ref 0–0.4)
EOSINOPHIL NFR BLD AUTO: 3.7 % (ref 0.3–6.2)
ERYTHROCYTE [DISTWIDTH] IN BLOOD BY AUTOMATED COUNT: 12.2 % (ref 12.3–15.4)
ERYTHROCYTE [SEDIMENTATION RATE] IN BLOOD: 11 MM/HR (ref 0–30)
GLOBULIN UR ELPH-MCNC: 2.6 GM/DL
GLUCOSE SERPL-MCNC: 91 MG/DL (ref 65–99)
HCT VFR BLD AUTO: 39.2 % (ref 34–46.6)
HGB BLD-MCNC: 12.5 G/DL (ref 12–15.9)
IMM GRANULOCYTES # BLD AUTO: 0.05 10*3/MM3 (ref 0–0.05)
IMM GRANULOCYTES NFR BLD AUTO: 0.6 % (ref 0–0.5)
LIPASE SERPL-CCNC: 287 U/L (ref 13–60)
LYMPHOCYTES # BLD AUTO: 2.58 10*3/MM3 (ref 0.7–3.1)
LYMPHOCYTES NFR BLD AUTO: 30.6 % (ref 19.6–45.3)
MCH RBC QN AUTO: 30.2 PG (ref 26.6–33)
MCHC RBC AUTO-ENTMCNC: 31.9 G/DL (ref 31.5–35.7)
MCV RBC AUTO: 94.7 FL (ref 79–97)
MONOCYTES # BLD AUTO: 0.74 10*3/MM3 (ref 0.1–0.9)
MONOCYTES NFR BLD AUTO: 8.8 % (ref 5–12)
NEUTROPHILS NFR BLD AUTO: 4.7 10*3/MM3 (ref 1.7–7)
NEUTROPHILS NFR BLD AUTO: 55.8 % (ref 42.7–76)
NRBC BLD AUTO-RTO: 0 /100 WBC (ref 0–0.2)
PHOSPHATE SERPL-MCNC: 3.1 MG/DL (ref 2.5–4.5)
PLATELET # BLD AUTO: 273 10*3/MM3 (ref 140–450)
PMV BLD AUTO: 10.5 FL (ref 6–12)
POTASSIUM SERPL-SCNC: 3.9 MMOL/L (ref 3.5–5.2)
PROT SERPL-MCNC: 6.7 G/DL (ref 6–8.5)
RBC # BLD AUTO: 4.14 10*6/MM3 (ref 3.77–5.28)
SODIUM SERPL-SCNC: 141 MMOL/L (ref 136–145)
WBC NRBC COR # BLD: 8.42 10*3/MM3 (ref 3.4–10.8)

## 2022-12-04 PROCEDURE — 83690 ASSAY OF LIPASE: CPT | Performed by: INTERNAL MEDICINE

## 2022-12-04 PROCEDURE — 74176 CT ABD & PELVIS W/O CONTRAST: CPT

## 2022-12-04 PROCEDURE — 25010000002 HYDROMORPHONE PER 4 MG: Performed by: INTERNAL MEDICINE

## 2022-12-04 PROCEDURE — 82150 ASSAY OF AMYLASE: CPT | Performed by: INTERNAL MEDICINE

## 2022-12-04 PROCEDURE — 80053 COMPREHEN METABOLIC PANEL: CPT | Performed by: INTERNAL MEDICINE

## 2022-12-04 PROCEDURE — 82565 ASSAY OF CREATININE: CPT

## 2022-12-04 PROCEDURE — 84100 ASSAY OF PHOSPHORUS: CPT | Performed by: INTERNAL MEDICINE

## 2022-12-04 PROCEDURE — 25010000002 PROCHLORPERAZINE 10 MG/2ML SOLUTION: Performed by: INTERNAL MEDICINE

## 2022-12-04 PROCEDURE — G0378 HOSPITAL OBSERVATION PER HR: HCPCS

## 2022-12-04 PROCEDURE — 76705 ECHO EXAM OF ABDOMEN: CPT

## 2022-12-04 PROCEDURE — 85025 COMPLETE CBC W/AUTO DIFF WBC: CPT | Performed by: INTERNAL MEDICINE

## 2022-12-04 PROCEDURE — 25010000002 HYDROMORPHONE PER 4 MG: Performed by: STUDENT IN AN ORGANIZED HEALTH CARE EDUCATION/TRAINING PROGRAM

## 2022-12-04 PROCEDURE — 96375 TX/PRO/DX INJ NEW DRUG ADDON: CPT

## 2022-12-04 PROCEDURE — 96361 HYDRATE IV INFUSION ADD-ON: CPT

## 2022-12-04 PROCEDURE — 25010000002 ONDANSETRON PER 1 MG: Performed by: INTERNAL MEDICINE

## 2022-12-04 PROCEDURE — 85652 RBC SED RATE AUTOMATED: CPT | Performed by: INTERNAL MEDICINE

## 2022-12-04 PROCEDURE — 96376 TX/PRO/DX INJ SAME DRUG ADON: CPT

## 2022-12-04 PROCEDURE — 71250 CT THORAX DX C-: CPT

## 2022-12-04 RX ORDER — ROSUVASTATIN CALCIUM 10 MG/1
10 TABLET, COATED ORAL DAILY
COMMUNITY
End: 2022-12-06 | Stop reason: HOSPADM

## 2022-12-04 RX ORDER — ACETAMINOPHEN 325 MG/1
650 TABLET ORAL EVERY 4 HOURS PRN
Status: DISCONTINUED | OUTPATIENT
Start: 2022-12-04 | End: 2022-12-06 | Stop reason: HOSPADM

## 2022-12-04 RX ORDER — SODIUM CHLORIDE, SODIUM LACTATE, POTASSIUM CHLORIDE, CALCIUM CHLORIDE 600; 310; 30; 20 MG/100ML; MG/100ML; MG/100ML; MG/100ML
100 INJECTION, SOLUTION INTRAVENOUS CONTINUOUS
Status: DISCONTINUED | OUTPATIENT
Start: 2022-12-04 | End: 2022-12-06 | Stop reason: HOSPADM

## 2022-12-04 RX ORDER — OXYCODONE HYDROCHLORIDE AND ACETAMINOPHEN 5; 325 MG/1; MG/1
1 TABLET ORAL EVERY 6 HOURS PRN
Status: DISCONTINUED | OUTPATIENT
Start: 2022-12-04 | End: 2022-12-06 | Stop reason: HOSPADM

## 2022-12-04 RX ORDER — ONDANSETRON 2 MG/ML
4 INJECTION INTRAMUSCULAR; INTRAVENOUS EVERY 6 HOURS PRN
Status: DISCONTINUED | OUTPATIENT
Start: 2022-12-04 | End: 2022-12-06 | Stop reason: HOSPADM

## 2022-12-04 RX ORDER — SEMAGLUTIDE 2.68 MG/ML
1.5 INJECTION, SOLUTION SUBCUTANEOUS WEEKLY
COMMUNITY
End: 2022-12-06 | Stop reason: HOSPADM

## 2022-12-04 RX ORDER — TIZANIDINE 4 MG/1
8 TABLET ORAL EVERY 8 HOURS PRN
Status: DISCONTINUED | OUTPATIENT
Start: 2022-12-04 | End: 2022-12-06 | Stop reason: HOSPADM

## 2022-12-04 RX ORDER — SODIUM CHLORIDE 0.9 % (FLUSH) 0.9 %
10 SYRINGE (ML) INJECTION AS NEEDED
Status: DISCONTINUED | OUTPATIENT
Start: 2022-12-04 | End: 2022-12-06 | Stop reason: HOSPADM

## 2022-12-04 RX ORDER — SODIUM BICARBONATE 325 MG/1
325 TABLET ORAL 2 TIMES DAILY
COMMUNITY

## 2022-12-04 RX ORDER — HYDROMORPHONE HYDROCHLORIDE 1 MG/ML
0.5 INJECTION, SOLUTION INTRAMUSCULAR; INTRAVENOUS; SUBCUTANEOUS ONCE
Status: COMPLETED | OUTPATIENT
Start: 2022-12-04 | End: 2022-12-04

## 2022-12-04 RX ORDER — DULOXETIN HYDROCHLORIDE 30 MG/1
60 CAPSULE, DELAYED RELEASE ORAL DAILY
Status: DISCONTINUED | OUTPATIENT
Start: 2022-12-04 | End: 2022-12-06 | Stop reason: HOSPADM

## 2022-12-04 RX ORDER — HYDROCODONE BITARTRATE AND ACETAMINOPHEN 7.5; 325 MG/1; MG/1
1 TABLET ORAL EVERY 6 HOURS PRN
Status: DISCONTINUED | OUTPATIENT
Start: 2022-12-04 | End: 2022-12-05

## 2022-12-04 RX ORDER — SODIUM CHLORIDE 9 MG/ML
40 INJECTION, SOLUTION INTRAVENOUS AS NEEDED
Status: DISCONTINUED | OUTPATIENT
Start: 2022-12-04 | End: 2022-12-06 | Stop reason: HOSPADM

## 2022-12-04 RX ORDER — PROCHLORPERAZINE EDISYLATE 5 MG/ML
5 INJECTION INTRAMUSCULAR; INTRAVENOUS ONCE
Status: COMPLETED | OUTPATIENT
Start: 2022-12-04 | End: 2022-12-04

## 2022-12-04 RX ORDER — HYDROMORPHONE HYDROCHLORIDE 1 MG/ML
0.25 INJECTION, SOLUTION INTRAMUSCULAR; INTRAVENOUS; SUBCUTANEOUS EVERY 4 HOURS PRN
Status: DISCONTINUED | OUTPATIENT
Start: 2022-12-04 | End: 2022-12-06 | Stop reason: HOSPADM

## 2022-12-04 RX ORDER — LISINOPRIL 20 MG/1
40 TABLET ORAL DAILY
Status: DISCONTINUED | OUTPATIENT
Start: 2022-12-04 | End: 2022-12-06 | Stop reason: HOSPADM

## 2022-12-04 RX ORDER — CARVEDILOL 6.25 MG/1
6.25 TABLET ORAL 2 TIMES DAILY
Status: DISCONTINUED | OUTPATIENT
Start: 2022-12-04 | End: 2022-12-06 | Stop reason: HOSPADM

## 2022-12-04 RX ORDER — DULOXETIN HYDROCHLORIDE 30 MG/1
30 CAPSULE, DELAYED RELEASE ORAL NIGHTLY
Status: DISCONTINUED | OUTPATIENT
Start: 2022-12-04 | End: 2022-12-06 | Stop reason: HOSPADM

## 2022-12-04 RX ORDER — SODIUM CHLORIDE 0.9 % (FLUSH) 0.9 %
10 SYRINGE (ML) INJECTION EVERY 12 HOURS SCHEDULED
Status: DISCONTINUED | OUTPATIENT
Start: 2022-12-04 | End: 2022-12-06 | Stop reason: HOSPADM

## 2022-12-04 RX ORDER — FENOFIBRATE 145 MG/1
145 TABLET, COATED ORAL DAILY
Status: DISCONTINUED | OUTPATIENT
Start: 2022-12-04 | End: 2022-12-06 | Stop reason: HOSPADM

## 2022-12-04 RX ORDER — ACETAMINOPHEN, ASPIRIN AND CAFFEINE 250; 250; 65 MG/1; MG/1; MG/1
1 TABLET, FILM COATED ORAL EVERY 6 HOURS PRN
COMMUNITY

## 2022-12-04 RX ORDER — FAMOTIDINE 10 MG/ML
20 INJECTION, SOLUTION INTRAVENOUS EVERY 12 HOURS SCHEDULED
Status: DISCONTINUED | OUTPATIENT
Start: 2022-12-04 | End: 2022-12-06 | Stop reason: HOSPADM

## 2022-12-04 RX ADMIN — ONDANSETRON 4 MG: 2 INJECTION INTRAMUSCULAR; INTRAVENOUS at 05:34

## 2022-12-04 RX ADMIN — SODIUM CHLORIDE, POTASSIUM CHLORIDE, SODIUM LACTATE AND CALCIUM CHLORIDE 100 ML/HR: 600; 310; 30; 20 INJECTION, SOLUTION INTRAVENOUS at 18:25

## 2022-12-04 RX ADMIN — DULOXETINE HYDROCHLORIDE 30 MG: 30 CAPSULE, DELAYED RELEASE ORAL at 20:58

## 2022-12-04 RX ADMIN — HYDROMORPHONE HYDROCHLORIDE 0.5 MG: 1 INJECTION, SOLUTION INTRAMUSCULAR; INTRAVENOUS; SUBCUTANEOUS at 00:53

## 2022-12-04 RX ADMIN — CARVEDILOL 6.25 MG: 6.25 TABLET, FILM COATED ORAL at 08:57

## 2022-12-04 RX ADMIN — Medication 10 ML: at 08:56

## 2022-12-04 RX ADMIN — FAMOTIDINE 20 MG: 10 INJECTION INTRAVENOUS at 20:58

## 2022-12-04 RX ADMIN — DULOXETINE HYDROCHLORIDE 60 MG: 30 CAPSULE, DELAYED RELEASE ORAL at 08:57

## 2022-12-04 RX ADMIN — SODIUM CHLORIDE, POTASSIUM CHLORIDE, SODIUM LACTATE AND CALCIUM CHLORIDE 100 ML/HR: 600; 310; 30; 20 INJECTION, SOLUTION INTRAVENOUS at 05:34

## 2022-12-04 RX ADMIN — HYDROMORPHONE HYDROCHLORIDE 0.25 MG: 1 INJECTION, SOLUTION INTRAMUSCULAR; INTRAVENOUS; SUBCUTANEOUS at 05:34

## 2022-12-04 RX ADMIN — CARVEDILOL 6.25 MG: 6.25 TABLET, FILM COATED ORAL at 20:58

## 2022-12-04 RX ADMIN — FENOFIBRATE 145 MG: 145 TABLET ORAL at 08:57

## 2022-12-04 RX ADMIN — FAMOTIDINE 20 MG: 10 INJECTION INTRAVENOUS at 08:57

## 2022-12-04 RX ADMIN — LISINOPRIL 40 MG: 20 TABLET ORAL at 08:57

## 2022-12-04 RX ADMIN — PROCHLORPERAZINE EDISYLATE 5 MG: 5 INJECTION INTRAMUSCULAR; INTRAVENOUS at 10:30

## 2022-12-04 NOTE — H&P
[facility]  HISTORY AND PHYSICAL    Date of Admission: 12/3/2022  Primary Care Physician: Iris Winn APRN    Subjective     Chief Complaint: Abdominal bloating    History of Present Illness  57-year-old female who presents to the emergency department with diffuse abdominal pain.  Patient states that she has had the pain since yesterday.  She describes the pain more as a distention.  She decided to come the emergency department because she states it would not go away and worsened.  The pain is a pressure and more of a discomfort feeling.  She had nausea.  She had no vomiting.  The pain radiates around her back and into her ribs.  She had no bowel movement.  Complains of no bleeding issues.  She had crackers to eat yesterday.  She has no chest pain or shortness of breath.  She has no lower extremity edema.  Her lipase was noted to be 288 and her creatinine was up to 2.01.        Review of Systems   Abdominal bloating  Nausea    Otherwise complete ROS reviewed and negative except as mentioned in the HPI.    Past Medical History:   Past Medical History:   Diagnosis Date   • Aortic aneurysm (HCC)    • Diverticulosis    • History of colon polyps    • Hypertension    • Spinal stenosis    • Vitamin D deficiency      Past Surgical History:  Past Surgical History:   Procedure Laterality Date   • ADENOIDECTOMY     • APPENDECTOMY     •  SECTION      X 2   • HEMICOLOECTOMY W/ ANASTOMOSIS  2017   • LAPAROTOMY OVARIAN CYSTECTOMY      X 2   • TONSILLECTOMY       Social History:  reports that she has been smoking cigarettes. She has been smoking an average of .5 packs per day. She has never used smokeless tobacco. She reports that she does not currently use alcohol. She reports that she does not use drugs.    Family History: HTN    Allergies:  Allergies   Allergen Reactions   • Rocephin [Ceftriaxone] Hives   • Sulfa Antibiotics Hives       Medications:  Prior to  "Admission medications    Medication Sig Start Date End Date Taking? Authorizing Provider   carvedilol (COREG) 6.25 MG tablet Take 6.25 mg by mouth 2 (Two) Times a Day. 1/31/22   Darrel Townsend MD   DULoxetine (CYMBALTA) 30 MG capsule Take 30 mg by mouth Every Night. 1/31/22   Darrel Townsend MD   DULoxetine (CYMBALTA) 60 MG capsule Take 1 capsule by mouth Daily. 1/31/22   Darrel Townsend MD   fenofibrate (TRICOR) 145 MG tablet Take 145 mg by mouth Daily.    Darrel Townsend MD   lisinopril (PRINIVIL,ZESTRIL) 40 MG tablet Take 40 mg by mouth Daily.    Darrel Townsend MD   polyethylene glycol (MIRALAX) 17 GM/SCOOP powder Take 17 g by mouth As Needed.    Darrel Townsend MD   tiZANidine (ZANAFLEX) 4 MG tablet Take 2 tablets by mouth Every 8 (Eight) Hours As Needed. 4/21/22   Darrel Townsend MD   traMADol (ULTRAM) 50 MG tablet Take 100 mg by mouth Every 8 (Eight) Hours As Needed. 4/21/22   Darrel Townsend MD I have utilized all available immediate resources to obtain, update, and review the patient's current medications.    Objective     Vital Signs: /78 (BP Location: Left arm, Patient Position: Lying)   Pulse 75   Temp 97.7 °F (36.5 °C) (Oral)   Resp 15   Ht 170.2 cm (67\")   Wt 79.8 kg (176 lb)   SpO2 95%   BMI 27.57 kg/m²   Physical Exam  Vitals reviewed.   Constitutional:       Appearance: Normal appearance.   HENT:      Head: Normocephalic and atraumatic.      Right Ear: External ear normal.      Left Ear: External ear normal.      Nose: Nose normal.   Eyes:      General: No scleral icterus.     Conjunctiva/sclera: Conjunctivae normal.   Cardiovascular:      Rate and Rhythm: Normal rate and regular rhythm.      Heart sounds: Normal heart sounds.   Pulmonary:      Effort: Pulmonary effort is normal.      Breath sounds: Normal breath sounds.   Abdominal:      General: There is no distension.      Palpations: Abdomen is soft.      Tenderness: There is " abdominal tenderness.   Musculoskeletal:         General: No swelling or tenderness.      Cervical back: Normal range of motion and neck supple.   Skin:     General: Skin is warm and dry.   Neurological:      General: No focal deficit present.      Mental Status: She is alert.      Cranial Nerves: No cranial nerve deficit.   Psychiatric:         Mood and Affect: Mood normal.         Behavior: Behavior normal.       Results Reviewed:  Lab Results (last 24 hours)     Procedure Component Value Units Date/Time    POC Creatinine [[id number]]  (Normal) Resulted: 12/04/22 0025    Specimen: Blood Updated: 12/04/22 0057     Creatinine 1.90 mg/dL     Comprehensive Metabolic Panel [[id number]]  (Abnormal) Collected: 12/03/22 1719    Specimen: Blood Updated: 12/03/22 1743     Glucose 91 mg/dL      BUN 28 mg/dL      Creatinine 2.01 mg/dL      Sodium 137 mmol/L      Potassium 4.5 mmol/L      Chloride 101 mmol/L      CO2 26.0 mmol/L      Calcium 9.9 mg/dL      Total Protein 7.4 g/dL      Albumin 4.80 g/dL      ALT (SGPT) 6 U/L      AST (SGOT) 18 U/L      Alkaline Phosphatase 41 U/L      Total Bilirubin 0.2 mg/dL      Globulin 2.6 gm/dL      A/G Ratio 1.8 g/dL      BUN/Creatinine Ratio 13.9     Anion Gap 10.0 mmol/L      eGFR 28.1 mL/min/1.73      Comment: National Kidney Foundation and American Society of Nephrology (ASN) Task Force recommended calculation based on the Chronic Kidney Disease Epidemiology Collaboration (CKD-EPI) equation refit without adjustment for race.       Narrative:      GFR Normal >60  Chronic Kidney Disease <60  Kidney Failure <15      Troponin [[id number]]  (Normal) Collected: 12/03/22 1719    Specimen: Blood Updated: 12/03/22 1741     Troponin T <0.010 ng/mL     Narrative:      Troponin T Reference Range:  <= 0.03 ng/mL-   Negative for AMI  >0.03 ng/mL-     Abnormal for myocardial necrosis.  Clinicians would have to utilize clinical acumen, EKG, Troponin and serial changes to determine if it is an Acute  "Myocardial Infarction or myocardial injury due to an underlying chronic condition.       Results may be falsely decreased if patient taking Biotin.      Lipase [[id number]]  (Abnormal) Collected: 12/03/22 1719    Specimen: Blood Updated: 12/03/22 1739     Lipase 288 U/L     Magnesium [[id number]]  (Normal) Collected: 12/03/22 1719    Specimen: Blood Updated: 12/03/22 1738     Magnesium 2.3 mg/dL     D-dimer, Quantitative [[id number]]  (Abnormal) Collected: 12/03/22 1719    Specimen: Blood Updated: 12/03/22 1735     D-Dimer, Quantitative 1.96 MCGFEU/mL     Narrative:      According to the assay 's published package insert, a normal (<0.50 MCGFEU/mL) D-dimer result in conjunction with a non-high clinical probability assessment, excludes deep vein thrombosis (DVT) and pulmonary embolism (PE) with high sensitivity.    D-dimer values increase with age and this can make VTE exclusion of an older population difficult. To address this, the American College [employer], based on best available evidence and recent guidelines, recommends that clinicians use age-adjusted D-dimer thresholds in patients greater than 50 years of age with: a) a low probability of PE who do not meet all Pulmonary Embolism Rule Out Criteria, or b) in those with intermediate probability of PE.   The formula for an age-adjusted D-dimer cut-off is \"age/100\".  For example, a 60 year old patient would have an age-adjusted cut-off of 0.60 MCGFEU/mL and an 80 year old 0.80 MCGFEU/mL.    CBC & Differential [[id number]]  (Abnormal) Collected: 12/03/22 1719    Specimen: Blood Updated: 12/03/22 1723    Narrative:      The following orders were created for panel order CBC & Differential.  Procedure                               Abnormality         Status                     ---------                               -----------         ------                     CBC Auto Differential[[id number]]        Abnormal            Final result             "     Please view results for these tests on the individual orders.    CBC Auto Differential [[id number]]  (Abnormal) Collected: 12/03/22 1719    Specimen: Blood Updated: 12/03/22 1723     WBC 9.92 10*3/mm3      RBC 4.37 10*6/mm3      Hemoglobin 13.2 g/dL      Hematocrit 41.4 %      MCV 94.7 fL      MCH 30.2 pg      MCHC 31.9 g/dL      RDW 12.2 %      RDW-SD 42.6 fl      MPV 10.6 fL      Platelets 293 10*3/mm3      Neutrophil % 68.0 %      Lymphocyte % 20.5 %      Monocyte % 8.3 %      Eosinophil % 2.7 %      Basophil % 0.3 %      Immature Grans % 0.2 %      Neutrophils, Absolute 6.75 10*3/mm3      Lymphocytes, Absolute 2.03 10*3/mm3      Monocytes, Absolute 0.82 10*3/mm3      Eosinophils, Absolute 0.27 10*3/mm3      Basophils, Absolute 0.03 10*3/mm3      Immature Grans, Absolute 0.02 10*3/mm3      nRBC 0.0 /100 WBC         Imaging Results (Last 24 Hours)     Procedure Component Value Units Date/Time    CT Abdomen Pelvis Without Contrast [[id number]] Resulted: 12/04/22 0104     Updated: 12/04/22 0122    CT Chest Without Contrast Diagnostic [[id number]] Resulted: 12/04/22 0104     Updated: 12/04/22 0122    XR Chest 1 View [[id number]] Collected: 12/03/22 1708     Updated: 12/03/22 1712    Narrative:      EXAM/TECHNIQUE: XR CHEST 1 VW-     INDICATION: Rib pain bilaterally     COMPARISON: None available.     FINDINGS:     Cardiac silhouette is normal size. No pleural effusion, pneumothorax, or  focal consolidation. No acute osseous finding.       Impression:         No acute findings.  This report was finalized on 12/03/2022 17:09 by Dr. Jacobo Michaud MD.        I have personally reviewed and interpreted the radiology studies and ECG obtained at time of admission.     Assessment / Plan     Assessment:   Active Hospital Problems    Diagnosis    • **Generalized abdominal pain      Impression:  1. Abdominal pain  2. 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 Elevated lipase possible pancreatitis  4.  Nausea  5. Known aneurism   6.   Elevated D-dimer    Plan:   1. Admit to observation  2. IVF  3. FU labs in the am to include lipase  4. PRN zofran  5. Pepcid  6.  Pain control    7.  CT chest with contrast was not performed secondary to elevated creatinine.  May consider VQ scan, patient's vital signs stable.    Code Status/Advanced Care Plan: Full    The patient's surrogate decision maker is Family.     I discussed my findings and recommendations with the patient.    Estimated length of stay is overnight.     The patient was seen and examined by me on 12/4/22 at 5.    Electronically signed by Rekha Longoria DO, 12/04/22, 05:11 CST.

## 2022-12-04 NOTE — PROGRESS NOTES
Patient was seen and examined at bedside.  Patient was evaluated after midnight by Dr. Francis.  Patient has findings consistent with acute pancreatitis.  In order to meet diagnostic criteria, the patient needs to have a lipase that is 3 times upper limit of normal, abdominal pain, and/or imaging findings.  Patient needs only 2-3 above findings to diagnose acute pancreatitis.  Patient has elevated lipase as well as the abdominal pain consistent with acute pancreatitis.  Patient likely has this related to medications.  She is on a few medications that may cause this including Ozempic.  This also could be idiopathic.  Patient adamantly denies any alcohol use.  She denies even casual alcohol use.  I have made the patient n.p.o. except for ice chips and water.  As needed pain control.  Ultrasound of gallbladder.

## 2022-12-04 NOTE — PLAN OF CARE
Goal Outcome Evaluation:  Plan of Care Reviewed With: patient        Progress: no change  Outcome Evaluation: Pt denies pain so far this shift; IVF infusing; got nauseated after trying to eat clears at breakfast, made NPO except ice chips; US gallbladder complete, results posted; up ad dipak; voiding; pt resting comfortably at this time; safety maintained.

## 2022-12-05 PROBLEM — I10 ESSENTIAL HYPERTENSION: Status: ACTIVE | Noted: 2022-12-05

## 2022-12-05 LAB
ALBUMIN SERPL-MCNC: 3.6 G/DL (ref 3.5–5.2)
ALBUMIN/GLOB SERPL: 1.5 G/DL
ALP SERPL-CCNC: 36 U/L (ref 39–117)
ALT SERPL W P-5'-P-CCNC: 5 U/L (ref 1–33)
ANION GAP SERPL CALCULATED.3IONS-SCNC: 9 MMOL/L (ref 5–15)
AST SERPL-CCNC: 17 U/L (ref 1–32)
BASOPHILS # BLD AUTO: 0.03 10*3/MM3 (ref 0–0.2)
BASOPHILS NFR BLD AUTO: 0.4 % (ref 0–1.5)
BILIRUB SERPL-MCNC: 0.3 MG/DL (ref 0–1.2)
BUN SERPL-MCNC: 21 MG/DL (ref 6–20)
BUN/CREAT SERPL: 12.5 (ref 7–25)
CALCIUM SPEC-SCNC: 9.2 MG/DL (ref 8.6–10.5)
CHLORIDE SERPL-SCNC: 106 MMOL/L (ref 98–107)
CO2 SERPL-SCNC: 25 MMOL/L (ref 22–29)
CREAT SERPL-MCNC: 1.68 MG/DL (ref 0.57–1)
DEPRECATED RDW RBC AUTO: 42.5 FL (ref 37–54)
EGFRCR SERPLBLD CKD-EPI 2021: 34.9 ML/MIN/1.73
EOSINOPHIL # BLD AUTO: 0.15 10*3/MM3 (ref 0–0.4)
EOSINOPHIL NFR BLD AUTO: 1.9 % (ref 0.3–6.2)
ERYTHROCYTE [DISTWIDTH] IN BLOOD BY AUTOMATED COUNT: 12.2 % (ref 12.3–15.4)
GLOBULIN UR ELPH-MCNC: 2.4 GM/DL
GLUCOSE SERPL-MCNC: 83 MG/DL (ref 65–99)
HCT VFR BLD AUTO: 35.3 % (ref 34–46.6)
HGB BLD-MCNC: 11 G/DL (ref 12–15.9)
IMM GRANULOCYTES # BLD AUTO: 0.03 10*3/MM3 (ref 0–0.05)
IMM GRANULOCYTES NFR BLD AUTO: 0.4 % (ref 0–0.5)
LIPASE SERPL-CCNC: 97 U/L (ref 13–60)
LYMPHOCYTES # BLD AUTO: 2.26 10*3/MM3 (ref 0.7–3.1)
LYMPHOCYTES NFR BLD AUTO: 29.2 % (ref 19.6–45.3)
MCH RBC QN AUTO: 29.7 PG (ref 26.6–33)
MCHC RBC AUTO-ENTMCNC: 31.2 G/DL (ref 31.5–35.7)
MCV RBC AUTO: 95.4 FL (ref 79–97)
MONOCYTES # BLD AUTO: 0.66 10*3/MM3 (ref 0.1–0.9)
MONOCYTES NFR BLD AUTO: 8.5 % (ref 5–12)
NEUTROPHILS NFR BLD AUTO: 4.6 10*3/MM3 (ref 1.7–7)
NEUTROPHILS NFR BLD AUTO: 59.6 % (ref 42.7–76)
NRBC BLD AUTO-RTO: 0 /100 WBC (ref 0–0.2)
PLATELET # BLD AUTO: 238 10*3/MM3 (ref 140–450)
PMV BLD AUTO: 10.9 FL (ref 6–12)
POTASSIUM SERPL-SCNC: 4.1 MMOL/L (ref 3.5–5.2)
PROT SERPL-MCNC: 6 G/DL (ref 6–8.5)
RBC # BLD AUTO: 3.7 10*6/MM3 (ref 3.77–5.28)
SODIUM SERPL-SCNC: 140 MMOL/L (ref 136–145)
TRIGL SERPL-MCNC: 136 MG/DL (ref 0–150)
WBC NRBC COR # BLD: 7.73 10*3/MM3 (ref 3.4–10.8)

## 2022-12-05 PROCEDURE — 80053 COMPREHEN METABOLIC PANEL: CPT | Performed by: INTERNAL MEDICINE

## 2022-12-05 PROCEDURE — 83690 ASSAY OF LIPASE: CPT | Performed by: INTERNAL MEDICINE

## 2022-12-05 PROCEDURE — 96361 HYDRATE IV INFUSION ADD-ON: CPT

## 2022-12-05 PROCEDURE — 96376 TX/PRO/DX INJ SAME DRUG ADON: CPT

## 2022-12-05 PROCEDURE — G0378 HOSPITAL OBSERVATION PER HR: HCPCS

## 2022-12-05 PROCEDURE — 85025 COMPLETE CBC W/AUTO DIFF WBC: CPT | Performed by: INTERNAL MEDICINE

## 2022-12-05 PROCEDURE — 84478 ASSAY OF TRIGLYCERIDES: CPT

## 2022-12-05 PROCEDURE — 94799 UNLISTED PULMONARY SVC/PX: CPT

## 2022-12-05 RX ADMIN — TIZANIDINE 8 MG: 4 TABLET ORAL at 21:10

## 2022-12-05 RX ADMIN — DULOXETINE HYDROCHLORIDE 60 MG: 30 CAPSULE, DELAYED RELEASE ORAL at 09:52

## 2022-12-05 RX ADMIN — FAMOTIDINE 20 MG: 10 INJECTION INTRAVENOUS at 09:52

## 2022-12-05 RX ADMIN — FENOFIBRATE 145 MG: 145 TABLET ORAL at 09:53

## 2022-12-05 RX ADMIN — CARVEDILOL 6.25 MG: 6.25 TABLET, FILM COATED ORAL at 21:10

## 2022-12-05 RX ADMIN — FAMOTIDINE 20 MG: 10 INJECTION INTRAVENOUS at 21:09

## 2022-12-05 RX ADMIN — CARVEDILOL 6.25 MG: 6.25 TABLET, FILM COATED ORAL at 09:52

## 2022-12-05 RX ADMIN — ACETAMINOPHEN 650 MG: 325 TABLET ORAL at 12:54

## 2022-12-05 RX ADMIN — LISINOPRIL 40 MG: 20 TABLET ORAL at 09:52

## 2022-12-05 RX ADMIN — Medication 10 ML: at 09:53

## 2022-12-05 RX ADMIN — DULOXETINE HYDROCHLORIDE 30 MG: 30 CAPSULE, DELAYED RELEASE ORAL at 21:10

## 2022-12-05 RX ADMIN — OXYCODONE HYDROCHLORIDE AND ACETAMINOPHEN 1 TABLET: 5; 325 TABLET ORAL at 21:09

## 2022-12-05 RX ADMIN — SODIUM CHLORIDE, POTASSIUM CHLORIDE, SODIUM LACTATE AND CALCIUM CHLORIDE 100 ML/HR: 600; 310; 30; 20 INJECTION, SOLUTION INTRAVENOUS at 13:01

## 2022-12-05 NOTE — PROGRESS NOTES
[facility] Medicine Services  INPATIENT PROGRESS NOTE    Patient Name: Bettina Stevens  Date of Admission: 12/3/2022  Today's Date: 12/05/22  Length of Stay: 0  Primary Care Physician: Iris Winn APRN    Subjective   Chief Complaint: Follow-up abdominal bloating  HPI   Patient reports that she is feeling much better today.  She does not have any abdominal pain.  She has not required any pain medication.  Discussed advancing to full liquid diet for lunch and possibly regular diet for dinner.  She is hopeful to be discharged in the morning.    Review of Systems   All pertinent negatives and positives are as above. All other systems have been reviewed and are negative unless otherwise stated.     Objective    Temp:  [97.6 °F (36.4 °C)-98.6 °F (37 °C)] 97.7 °F (36.5 °C)  Heart Rate:  [63-85] 73  Resp:  [16] 16  BP: (100-129)/(57-72) 123/67  Physical Exam  Vitals and nursing note reviewed.   Constitutional:       General: She is not in acute distress.     Appearance: Normal appearance.      Comments: No visitors at bedside   HENT:      Head: Normocephalic.   Cardiovascular:      Rate and Rhythm: Normal rate and regular rhythm.      Pulses: Normal pulses.      Heart sounds: Normal heart sounds. No murmur heard.     Comments: Heart rate 73  Pulmonary:      Effort: Pulmonary effort is normal. No respiratory distress.      Breath sounds: Normal breath sounds. No wheezing.      Comments: Room air  Abdominal:      General: Bowel sounds are normal. There is no distension.      Palpations: Abdomen is soft.      Tenderness: There is no abdominal tenderness.   Musculoskeletal:         General: No swelling or tenderness. Normal range of motion.      Cervical back: Normal range of motion. No rigidity.   Skin:     General: Skin is warm and dry.      Capillary Refill: Capillary refill takes less than 2 seconds.      Findings: No bruising, erythema or rash.   Neurological:      Mental Status: She is  alert and oriented to person, place, and time. Mental status is at baseline.      Motor: No weakness.       Results Review:  I have reviewed the labs, radiology results, and diagnostic studies.    Laboratory Data:   Results from last 7 days   Lab Units 12/05/22  0350 12/04/22  0544 12/03/22  1719   WBC 10*3/mm3 7.73 8.42 9.92   HEMOGLOBIN g/dL 11.0* 12.5 13.2   HEMATOCRIT % 35.3 39.2 41.4   PLATELETS 10*3/mm3 238 273 293        Results from last 7 days   Lab Units 12/05/22  0350 12/04/22  0544 12/04/22  0025 12/04/22  0023 12/03/22  1719   SODIUM mmol/L 140 141  --   --  137   POTASSIUM mmol/L 4.1 3.9  --   --  4.5   CHLORIDE mmol/L 106 104  --   --  101   CO2 mmol/L 25.0 26.0  --   --  26.0   BUN mg/dL 21* 23*  --   --  28*   CREATININE mg/dL 1.68* 1.82* 1.90   < > 2.01*   CALCIUM mg/dL 9.2 9.4  --   --  9.9   BILIRUBIN mg/dL 0.3 0.3  --   --  0.2   ALK PHOS U/L 36* 40  --   --  41   ALT (SGPT) U/L 5 6  --   --  6   AST (SGOT) U/L 17 17  --   --  18   GLUCOSE mg/dL 83 91  --   --  91    < > = values in this interval not displayed.       Culture Data:   No results found for: BLOODCX, URINECX, WOUNDCX, MRSACX, RESPCX, STOOLCX    Radiology Data:   Imaging Results (Last 24 Hours)     Procedure Component Value Units Date/Time    CT Chest Without Contrast Diagnostic [[id number]] Collected: 12/04/22 1437     Updated: 12/04/22 1444    Narrative:      EXAMINATION: CT CHEST WO CONTRAST DIAGNOSTIC- 12/4/2022 2:37 PM CST     HISTORY: Bilateral chest pain     DOSE: 145 mGycm (Automatic exposure control technique was implemented in  an effort to keep the radiation dose as low as possible without  compromising image quality)     REPORT: Spiral CT of the chest was performed without intravenous  contrast from the thoracic inlet through the upper abdomen.  Reconstructed coronal and sagittal images are also reviewed.     Comparison: Chest x-ray 12/03/2022.     Review of lung windows in search mild respiratory motion artifact.  There  is normal aeration of the lungs, except for mild atelectasis in the  lingula and right middle lobe. No pneumothorax or pleural effusion is  identified. The airways are patent. There is no lung mass or suspicious  pulmonary nodule. There is a tiny pleural-based nodule in the left lower  lobe which appears to contain a central calcification, compatible with a  granuloma. This measures 2 mm. Image 117 series 3. Mediastinal windows  show no evidence of intrathoracic lymphadenopathy. Heart size is normal.  There is moderate volume of calcified plaque within the thoracic aorta,  which is ectatic. The ascending aorta has a maximum diameter of 3.4 cm,  at the arch, the thoracic aorta has a maximum diameter of 3.1 cm. There  is ectasia and aneurysmal dilation of the descending thoracic aorta with  a maximum diameter 3.7 cm and there is the suprarenal abdominal aortic  aneurysm with a maximum diameter of 3.7 cm. No evidence of aneurysm  rupture is identified. Findings in the abdomen are described in detail  in a separate report. Review of bone windows is unremarkable.       Impression:      1. No acute intrathoracic abnormality is identified. There is aneurysmal  dilation of the descending thoracic aorta with a maximum diameter of 3.7  cm, no evidence of rupture. Moderately heavy calcified plaque is noted  within the thoracic aorta. Evaluation of the vascular structures is  limited without intravenous contrast.  2. In the upper abdomen there is evidence of a suprarenal abdominal  aortic aneurysm with a maximum diameter of 3.7 cm.     A preliminary report was provided by the [facility] radiology service.                 This report was finalized on 12/04/2022 14:41 by Dr. Trino Kent MD.    CT Abdomen Pelvis Without Contrast [[id number]] Collected: 12/04/22 1431     Updated: 12/04/22 1439    Narrative:      EXAMINATION: CT ABDOMEN PELVIS WO CONTRAST- 12/4/2022 2:31 PM CST     HISTORY: Epigastric abdominal pain     DOSE:  322 mGycm (Automatic exposure control technique was implemented in  an effort to keep the radiation dose as low as possible without  compromising image quality)     REPORT: Spiral CT of the abdomen and pelvis was performed without  contrast from the lung bases through the pubic symphysis. Reconstructed  coronal and sagittal images are also reviewed.     Comparison: There are no correlative imaging studies for comparison.     Review of lung windows demonstrates mild atelectasis in the lingula and  right middle lobe. No pleural effusion is identified. There appears to  be dilation of the distal descending thoracic aorta with a diameter 3.7  cm. This may be exaggerated by the aortic ectasia. The liver and spleen  are homogeneous, normal in size. No gallstones are identified. There is  no gallbladder wall thickening or pericholecystic fluid. The stomach is  decompressed. The pancreas and adrenal glands are unremarkable. The  renal contours are smooth. There is no nephrolithiasis or  hydronephrosis. The ureters are decompressed. No free fluid or free air  is identified. The bladder appears within normal limits. There is fatty  infiltration of the inguinal canals. There are surgical suture lines at  the distal sigmoid colon with previous partial colon resection.  Scattered diverticula are noted within the left colon. No evidence of  acute diverticulitis is identified. Moderate stool volume is present.  The appendix is surgically absent by history. There is a small  fat-containing periumbilical ventral hernia that measures 18 x 16 mm.  Small volume of calcified plaque is noted within the abdominal aorta.  There is aneurysmal dilation of the upper abdominal aorta with a  diameter of 3.7 x 3.7 cm. Review of bone windows shows no acute  abnormality.       Impression:      1. No acute intra-abdominal or pelvic abnormality.  2. Suprarenal abdominal aortic aneurysm with a maximum diameter 3.7 cm.  There also appears to be  aneurysmal dilation of the descending thoracic  aorta, with a diameter 3.7 cm.  3. Evidence of previous partial colon resection. Mild diverticulosis of  the left colon without acute diverticulitis. 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  4. Fat-containing periumbilical hernia with the maximum size of 18 x 16  mm.     A preliminary report was provided by the [facility] radiology service.           This report was finalized on 12/04/2022 14:36 by Dr. Trino Kent MD.    US Gallbladder [[id number]] Collected: 12/04/22 1356     Updated: 12/04/22 1401    Narrative:      EXAMINATION: US GALLBLADDER- 12/4/2022 1:56 PM CST     HISTORY: abdominal pain; R10.84-Generalized abdominal pain;  R74.8-Abnormal levels of other serum enzymes     REPORT: Sonographic images of the gallbladder/right upper quadrant were  obtained.     COMPARISON: CT abdomen pelvis 12/04/2022.     The visualized pancreas appears normal. The proximal IVC is normal  caliber. The proximal aorta has a maximum diameter of 2.9 cm, the mid  aorta measures 3.5 cm in diameter in the distal aorta is normal caliber.  The liver parenchyma is homogeneous without evidence of a mass. Small  echogenic dependent stones are noted within the gallbladder with  gallbladder wall thickness of 2.2 mm, normal. No pericholecystic fluid  is identified. The common hepatic duct measures 4.4 mm in diameter,  normal. No free fluid is identified. Color Doppler images demonstrate  patency of the portal vein, with normal flow direction. Limited views of  the right kidney show no evidence of hydronephrosis.       Impression:      1. Several small dependent gallstones are present in the gallbladder,  without evidence of acute cholecystitis. No evidence of biliary ductal  dilatation.  2. Homogeneous normal appearance of the liver.  3. Mild aneurysmal dilation of the mid abdominal aorta measured 3.5 x  3.5 cm.  This report was finalized on 12/04/2022 13:58 by Dr. Trino Kent MD.          I  "have reviewed the patient's current medications.     Assessment/Plan     Active Hospital Problems    Diagnosis    • **Acute pancreatitis    • Generalized abdominal pain        Plan:  Patient presented to emergency department on 12/3/2022 with complaints of diffuse abdominal pain and bloating.  She reported that the pain started the day prior to admission.  She described the pain as \"distention\" with pressure and discomfort which is associated with nausea.  The pain radiates around her back and under her ribs.  She denied vomiting or diarrhea..  When the pain became worse and would not go away she decided to present to the emergency department.  Work-up revealed creatinine 2.01, lipase 288, D-dimer 1.96.  Chest x-ray showed no acute findings.  She was admitted for further evaluation and treatment.    CT of the abdomen and pelvis did not show any acute intra-abdominal or pelvic abnormality.  However, lipase was elevated at 288 and she complained of abdominal pain which meets criteria for acute pancreatitis.  Lipase this morning is 97.  It is felt that this is either related to medications or possibly idiopathic.  She denies alcohol use.  Will check triglycerides today.  Ultrasound of the gallbladder was obtained which showed several small dependent gallstones without evidence of acute cholecystitis.  She remains NPO except for ice chips and sips of water.  Will advance to full liquid diet for lunch and possibly regular diet for dinner if she tolerates it.  Order placed for nursing to advance diet as tolerated.  Continue IV Pepcid.    She has Percocet and Dilaudid available as needed for pain but has not required any pain medication since yesterday.  Zofran as needed for nausea.    Creatinine was 2.01 on admission which has trended down to 1.68.  Continue IV fluids and avoid nephrotoxic medications.    D-dimer was elevated on admission at 1.96.  She does not complain of chest pain or shortness of breath.  She is not " requiring supplemental oxygen.  It was noted that CT of the chest with contrast could not be performed due to elevated creatinine and VQ scan may be ordered.  However, she does not have any symptoms consistent with PE.    Patient takes carvedilol and lisinopril for hypertension which were continued on admission.  Blood pressure has remained well controlled.    SCDs for DVT prophylaxis    Discharge Planning: I expect the patient to be discharged home tomorrow.    Electronically signed by MAYDA Don, 12/05/22, 10:35 CST.

## 2022-12-05 NOTE — NURSING NOTE
Tolerated a full liquid diet at lunch without problems. Advanced to low irritant/bland regular diet for dinner.

## 2022-12-05 NOTE — PLAN OF CARE
Goal Outcome Evaluation:  Plan of Care Reviewed With: patient, daughter        Progress: improving  Outcome Evaluation: Diet advanced without problems. IVF infusing. Tylenol given x1 for headache. A.M. labs tomorrow, possible dc home if tolerating diet.

## 2022-12-05 NOTE — PLAN OF CARE
Goal Outcome Evaluation:  Plan of Care Reviewed With: patient  Progress: improving         Pt with no c/o pain or nausea this shift. Tolerating ice chips and sm sips of clrs. IVF cont to infuse per order. Up ad dipak. Voiding per BRP. Pt stating she's feels much better than when she arrived. Family at bedside. VSS. Safety maintained.

## 2022-12-06 VITALS
SYSTOLIC BLOOD PRESSURE: 128 MMHG | OXYGEN SATURATION: 97 % | HEART RATE: 70 BPM | WEIGHT: 176 LBS | HEIGHT: 67 IN | BODY MASS INDEX: 27.62 KG/M2 | DIASTOLIC BLOOD PRESSURE: 82 MMHG | TEMPERATURE: 97.9 F | RESPIRATION RATE: 16 BRPM

## 2022-12-06 LAB
ANION GAP SERPL CALCULATED.3IONS-SCNC: 8 MMOL/L (ref 5–15)
BUN SERPL-MCNC: 18 MG/DL (ref 6–20)
BUN/CREAT SERPL: 10.7 (ref 7–25)
CALCIUM SPEC-SCNC: 9 MG/DL (ref 8.6–10.5)
CHLORIDE SERPL-SCNC: 108 MMOL/L (ref 98–107)
CO2 SERPL-SCNC: 25 MMOL/L (ref 22–29)
CREAT SERPL-MCNC: 1.68 MG/DL (ref 0.57–1)
DEPRECATED RDW RBC AUTO: 42.5 FL (ref 37–54)
EGFRCR SERPLBLD CKD-EPI 2021: 34.9 ML/MIN/1.73
ERYTHROCYTE [DISTWIDTH] IN BLOOD BY AUTOMATED COUNT: 12.2 % (ref 12.3–15.4)
GLUCOSE SERPL-MCNC: 89 MG/DL (ref 65–99)
HCT VFR BLD AUTO: 32.5 % (ref 34–46.6)
HGB BLD-MCNC: 10.5 G/DL (ref 12–15.9)
MCH RBC QN AUTO: 30.3 PG (ref 26.6–33)
MCHC RBC AUTO-ENTMCNC: 32.3 G/DL (ref 31.5–35.7)
MCV RBC AUTO: 93.7 FL (ref 79–97)
PLATELET # BLD AUTO: 215 10*3/MM3 (ref 140–450)
PMV BLD AUTO: 11 FL (ref 6–12)
POTASSIUM SERPL-SCNC: 3.8 MMOL/L (ref 3.5–5.2)
QT INTERVAL: 352 MS
QTC INTERVAL: 396 MS
RBC # BLD AUTO: 3.47 10*6/MM3 (ref 3.77–5.28)
SODIUM SERPL-SCNC: 141 MMOL/L (ref 136–145)
WBC NRBC COR # BLD: 6.89 10*3/MM3 (ref 3.4–10.8)

## 2022-12-06 PROCEDURE — G0378 HOSPITAL OBSERVATION PER HR: HCPCS

## 2022-12-06 PROCEDURE — 80048 BASIC METABOLIC PNL TOTAL CA: CPT

## 2022-12-06 PROCEDURE — 85027 COMPLETE CBC AUTOMATED: CPT

## 2022-12-06 RX ORDER — ACETAMINOPHEN 325 MG/1
650 TABLET ORAL EVERY 4 HOURS PRN
Start: 2022-12-06

## 2022-12-06 RX ADMIN — DULOXETINE HYDROCHLORIDE 60 MG: 30 CAPSULE, DELAYED RELEASE ORAL at 07:48

## 2022-12-06 RX ADMIN — FENOFIBRATE 145 MG: 145 TABLET ORAL at 07:48

## 2022-12-06 RX ADMIN — SODIUM CHLORIDE, POTASSIUM CHLORIDE, SODIUM LACTATE AND CALCIUM CHLORIDE 100 ML/HR: 600; 310; 30; 20 INJECTION, SOLUTION INTRAVENOUS at 00:05

## 2022-12-06 RX ADMIN — LISINOPRIL 40 MG: 20 TABLET ORAL at 07:48

## 2022-12-06 RX ADMIN — TIZANIDINE 8 MG: 4 TABLET ORAL at 07:48

## 2022-12-06 RX ADMIN — CARVEDILOL 6.25 MG: 6.25 TABLET, FILM COATED ORAL at 07:48

## 2022-12-06 NOTE — DISCHARGE SUMMARY
[facility]  DISCHARGE SUMMARY       Date of Admission: 12/3/2022  Date of Discharge:  12/6/2022  Primary Care Physician: Iris Winn APRN    Presenting Problem/Chief Complaint:  Abdominal bloating/pain    Final Discharge Diagnoses:  Active Hospital Problems    Diagnosis    • **Acute pancreatitis    • Essential hypertension    • Generalized abdominal pain        Consults: None    Procedures Performed: None    Pertinent Test Results:       Imaging Results (All)     Procedure Component Value Units Date/Time    CT Chest Without Contrast Diagnostic [[id number]] Collected: 12/04/22 1437     Updated: 12/04/22 1444    Narrative:      EXAMINATION: CT CHEST WO CONTRAST DIAGNOSTIC- 12/4/2022 2:37 PM CST     HISTORY: Bilateral chest pain     DOSE: 145 mGycm (Automatic exposure control technique was implemented in  an effort to keep the radiation dose as low as possible without  compromising image quality)     REPORT: Spiral CT of the chest was performed without intravenous  contrast from the thoracic inlet through the upper abdomen.  Reconstructed coronal and sagittal images are also reviewed.     Comparison: Chest x-ray 12/03/2022.     Review of lung windows in search mild respiratory motion artifact. There  is normal aeration of the lungs, except for mild atelectasis in the  lingula and right middle lobe. No pneumothorax or pleural effusion is  identified. The airways are patent. There is no lung mass or suspicious  pulmonary nodule. There is a tiny pleural-based nodule in the left lower  lobe which appears to contain a central calcification, compatible with a  granuloma. This measures 2 mm. Image 117 series 3. Mediastinal windows  show no evidence of intrathoracic lymphadenopathy. Heart size is normal.  There is moderate volume of calcified plaque within the thoracic aorta,  which is ectatic. The ascending aorta has a maximum diameter of 3.4 cm,  at the arch, the  thoracic aorta has a maximum diameter of 3.1 cm. There  is ectasia and aneurysmal dilation of the descending thoracic aorta with  a maximum diameter 3.7 cm and there is the suprarenal abdominal aortic  aneurysm with a maximum diameter of 3.7 cm. No evidence of aneurysm  rupture is identified. Findings in the abdomen are described in detail  in a separate report. Review of bone windows is unremarkable.       Impression:      1. No acute intrathoracic abnormality is identified. There is aneurysmal  dilation of the descending thoracic aorta with a maximum diameter of 3.7  cm, no evidence of rupture. Moderately heavy calcified plaque is noted  within the thoracic aorta. Evaluation of the vascular structures is  limited without intravenous contrast.  2. In the upper abdomen there is evidence of a suprarenal abdominal  aortic aneurysm with a maximum diameter of 3.7 cm.     A preliminary report was provided by the [facility] radiology service.                 This report was finalized on 12/04/2022 14:41 by Dr. Trino Kent MD.    CT Abdomen Pelvis Without Contrast [[id number]] Collected: 12/04/22 1431     Updated: 12/04/22 1439    Narrative:      EXAMINATION: CT ABDOMEN PELVIS WO CONTRAST- 12/4/2022 2:31 PM CST     HISTORY: Epigastric abdominal pain     DOSE: 322 mGycm (Automatic exposure control technique was implemented in  an effort to keep the radiation dose as low as possible without  compromising image quality)     REPORT: Spiral CT of the abdomen and pelvis was performed without  contrast from the lung bases through the pubic symphysis. Reconstructed  coronal and sagittal images are also reviewed.     Comparison: There are no correlative imaging studies for comparison.     Review of lung windows demonstrates mild atelectasis in the lingula and  right middle lobe. No pleural effusion is identified. There appears to  be dilation of the distal descending thoracic aorta with a diameter 3.7  cm. This may be exaggerated  by the aortic ectasia. The liver and spleen  are homogeneous, normal in size. No gallstones are identified. There is  no gallbladder wall thickening or pericholecystic fluid. The stomach is  decompressed. The pancreas and adrenal glands are unremarkable. The  renal contours are smooth. There is no nephrolithiasis or  hydronephrosis. The ureters are decompressed. No free fluid or free air  is identified. The bladder appears within normal limits. There is fatty  infiltration of the inguinal canals. There are surgical suture lines at  the distal sigmoid colon with previous partial colon resection.  Scattered diverticula are noted within the left colon. No evidence of  acute diverticulitis is identified. Moderate stool volume is present.  The appendix is surgically absent by history. There is a small  fat-containing periumbilical ventral hernia that measures 18 x 16 mm.  Small volume of calcified plaque is noted within the abdominal aorta.  There is aneurysmal dilation of the upper abdominal aorta with a  diameter of 3.7 x 3.7 cm. Review of bone windows shows no acute  abnormality.       Impression:      1. No acute intra-abdominal or pelvic abnormality.  2. Suprarenal abdominal aortic aneurysm with a maximum diameter 3.7 cm.  There also appears to be aneurysmal dilation of the descending thoracic  aorta, with a diameter 3.7 cm.  3. Evidence of previous partial colon resection. Mild diverticulosis of  the left colon without acute diverticulitis. 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  4. Fat-containing periumbilical hernia with the maximum size of 18 x 16  mm.     A preliminary report was provided by the [facility] radiology service.           This report was finalized on 12/04/2022 14:36 by Dr. Trino Kent MD.    US Gallbladder [[id number]] Collected: 12/04/22 1356     Updated: 12/04/22 1401    Narrative:      EXAMINATION: US GALLBLADDER- 12/4/2022 1:56 PM CST     HISTORY: abdominal pain; R10.84-Generalized abdominal  pain;  R74.8-Abnormal levels of other serum enzymes     REPORT: Sonographic images of the gallbladder/right upper quadrant were  obtained.     COMPARISON: CT abdomen pelvis 12/04/2022.     The visualized pancreas appears normal. The proximal IVC is normal  caliber. The proximal aorta has a maximum diameter of 2.9 cm, the mid  aorta measures 3.5 cm in diameter in the distal aorta is normal caliber.  The liver parenchyma is homogeneous without evidence of a mass. Small  echogenic dependent stones are noted within the gallbladder with  gallbladder wall thickness of 2.2 mm, normal. No pericholecystic fluid  is identified. The common hepatic duct measures 4.4 mm in diameter,  normal. No free fluid is identified. Color Doppler images demonstrate  patency of the portal vein, with normal flow direction. Limited views of  the right kidney show no evidence of hydronephrosis.       Impression:      1. Several small dependent gallstones are present in the gallbladder,  without evidence of acute cholecystitis. No evidence of biliary ductal  dilatation.  2. Homogeneous normal appearance of the liver.  3. Mild aneurysmal dilation of the mid abdominal aorta measured 3.5 x  3.5 cm.  This report was finalized on 12/04/2022 13:58 by Dr. Trino Kent MD.    XR Chest 1 View [[id number]] Collected: 12/03/22 1708     Updated: 12/03/22 1712    Narrative:      EXAM/TECHNIQUE: XR CHEST 1 VW-     INDICATION: Rib pain bilaterally     COMPARISON: None available.     FINDINGS:     Cardiac silhouette is normal size. No pleural effusion, pneumothorax, or  focal consolidation. No acute osseous finding.       Impression:         No acute findings.  This report was finalized on 12/03/2022 17:09 by Dr. Jacobo Michaud MD.          LAB RESULTS:      Lab 12/06/22  0328 12/05/22  0350 12/04/22  0544 12/03/22  1719   WBC 6.89 7.73 8.42 9.92   HEMOGLOBIN 10.5* 11.0* 12.5 13.2   HEMATOCRIT 32.5* 35.3 39.2 41.4   PLATELETS 215 238 273 293   NEUTROS ABS  --   4.60 4.70 6.75   IMMATURE GRANS (ABS)  --  0.03 0.05 0.02   LYMPHS ABS  --  2.26 2.58 2.03   MONOS ABS  --  0.66 0.74 0.82   EOS ABS  --  0.15 0.31 0.27   MCV 93.7 95.4 94.7 94.7   SED RATE  --   --  11  --    D DIMER QUANT  --   --   --  1.96*         Lab 12/06/22  0328 12/05/22  0350 12/04/22  0544 12/04/22  0025 12/04/22  0023 12/03/22 1719   SODIUM 141 140 141  --   --  137   POTASSIUM 3.8 4.1 3.9  --   --  4.5   CHLORIDE 108* 106 104  --   --  101   CO2 25.0 25.0 26.0  --   --  26.0   ANION GAP 8.0 9.0 11.0  --   --  10.0   BUN 18 21* 23*  --   --  28*   CREATININE 1.68* 1.68* 1.82* 1.90 1.90* 2.01*   EGFR 34.9* 34.9* 31.7*  --   --  28.1*   GLUCOSE 89 83 91  --   --  91   CALCIUM 9.0 9.2 9.4  --   --  9.9   MAGNESIUM  --   --   --   --   --  2.3   PHOSPHORUS  --   --  3.1  --   --   --          Lab 12/05/22  0350 12/04/22  0544 12/03/22 1719   TOTAL PROTEIN 6.0 6.7 7.4   ALBUMIN 3.60 4.10 4.80   GLOBULIN 2.4 2.6 2.6   ALT (SGPT) 5 6 6   AST (SGOT) 17 17 18   BILIRUBIN 0.3 0.3 0.2   ALK PHOS 36* 40 41   AMYLASE  --  145*  --    LIPASE 97* 287* 288*         Lab 12/03/22 1719   TROPONIN T <0.010         Lab 12/05/22  0350   TRIGLYCERIDES 136             Brief Urine Lab Results     None        Microbiology Results (last 10 days)     ** No results found for the last 240 hours. **          History of Present Illness on Day of Discharge: Patient reports that she is feeling much better today.  She tolerated regular diet last night.  She does not have any abdominal pain today.  Discussed stopping rosuvastatin and Ozempic for now.  She is agreeable.  She does not have any new complaints today.    Hospital Course:  The patient is a 57 y.o. female who follows with MAYDA Pappas for primary care.  She has a past medical history significant for aortic aneurysm, diverticulosis, hypertension, spinal stenosis.  Patient presented to emergency department on 12/3/2022 with complaints of diffuse abdominal pain and bloating.   "She reported that the pain started the day prior to admission.  She described the pain as \"distention\" with pressure and discomfort which is associated with nausea.  The pain radiates around her back and under her ribs.  She denied vomiting or diarrhea..  When the pain became worse and would not go away she decided to present to the emergency department.  Work-up revealed creatinine 2.01, lipase 288, D-dimer 1.96.  Chest x-ray showed no acute findings.  She was admitted for further evaluation and treatment.     CT of the abdomen and pelvis did not show any acute intra-abdominal or pelvic abnormality.  However, lipase was elevated at 288 and she complained of abdominal pain which meets criteria for acute pancreatitis.  Lipase this morning is 97.  It is felt that this is either related to medications or possibly idiopathic.  She did recently start taking rosuvastatin and Ozempic dose was increased so would recommend holding these medications until she is able to follow-up with her PCP to reevaluate. She denies alcohol use.  Triglycerides were within normal limits. Ultrasound of the gallbladder was obtained which showed several small dependent gallstones without evidence of acute cholecystitis.  She was initially NPO except for ice chips and sips of water.  She was advanced to full liquid diet for lunch on 12/5.  She tolerated regular diet on the evening of 12/5.  She reports that her abdominal pain has completely resolved.     She has not required any pain medication over the last 48 hours.  She is also not required any Zofran for nausea.     Creatinine was 2.01 on admission which has trended down to 1.68.  Would recommend continuing to drink plenty of fluids and avoiding nephrotoxic medications.     D-dimer was elevated on admission at 1.96.  She does not complain of chest pain or shortness of breath.  She is not requiring supplemental oxygen.  It was noted that CT of the chest with contrast could not be performed " "due to elevated creatinine and VQ scan may be ordered.  However, she does not have any symptoms consistent with PE.     Patient takes carvedilol and lisinopril for hypertension which were continued on admission.  Blood pressure has remained well controlled.    Patient feels that she is ready to go home today.  Labs have been reviewed.  Home medications reviewed and resumed as appropriate.  Patient is medically stable for discharge.    Condition on Discharge: Medically stable    Physical Exam on Discharge:  /82 (BP Location: Right arm, Patient Position: Sitting)   Pulse 70   Temp 97.9 °F (36.6 °C)   Resp 16   Ht 170.2 cm (67\")   Wt 79.8 kg (176 lb)   SpO2 97%   BMI 27.57 kg/m²   Physical Exam  Vitals and nursing note reviewed.   Constitutional:       General: She is not in acute distress.     Appearance: Normal appearance.      Comments: Daughter at bedside   HENT:      Head: Normocephalic.   Cardiovascular:      Rate and Rhythm: Normal rate and regular rhythm.      Pulses: Normal pulses.      Heart sounds: Normal heart sounds. No murmur heard.     Comments: Heart rate 70  Pulmonary:      Effort: Pulmonary effort is normal. No respiratory distress.      Breath sounds: Normal breath sounds. No wheezing.      Comments: Room air  Abdominal:      General: Bowel sounds are normal. There is no distension.      Palpations: Abdomen is soft.      Tenderness: There is no abdominal tenderness.   Musculoskeletal:         General: No swelling or tenderness. Normal range of motion.      Cervical back: Normal range of motion. No rigidity.   Skin:     General: Skin is warm and dry.      Capillary Refill: Capillary refill takes less than 2 seconds.      Findings: No bruising, erythema or rash.   Neurological:      Mental Status: She is alert and oriented to person, place, and time. Mental status is at baseline.      Motor: No weakness.     Discharge Disposition:  Home or Self Care    Discharge Medications:     Discharge " Medications      New Medications      Instructions Start Date   acetaminophen 325 MG tablet  Commonly known as: TYLENOL   650 mg, Oral, Every 4 Hours PRN         Continue These Medications      Instructions Start Date   aspirin-acetaminophen-caffeine 250-250-65 MG per tablet  Commonly known as: EXCEDRIN MIGRAINE   1 tablet, Oral, Every 6 Hours PRN      carvedilol 6.25 MG tablet  Commonly known as: COREG   6.25 mg, Oral, Daily      DULoxetine 60 MG capsule  Commonly known as: CYMBALTA   1 capsule, Oral, Daily      DULoxetine 30 MG capsule  Commonly known as: CYMBALTA   30 mg, Oral, Nightly      fenofibrate 145 MG tablet  Commonly known as: TRICOR   145 mg, Oral, Daily      lisinopril 40 MG tablet  Commonly known as: PRINIVIL,ZESTRIL   40 mg, Oral, Daily      polyethylene glycol 17 GM/SCOOP powder  Commonly known as: MIRALAX   17 g, Oral, As Needed      sodium bicarbonate 325 MG tablet   325 mg, Oral, 2 Times Daily      tiZANidine 4 MG tablet  Commonly known as: ZANAFLEX   Oral, Every 8 Hours PRN, 1 - 2 tablets      traMADol 50 MG tablet  Commonly known as: ULTRAM   Oral, Every 8 Hours PRN, 1 - 2 tablets         Stop These Medications    Ozempic (2 MG/DOSE) 8 MG/3ML solution pen-injector  Generic drug: Semaglutide (2 MG/DOSE)     rosuvastatin 10 MG tablet  Commonly known as: CRESTOR            Discharge Diet:   Diet Instructions     Diet: Regular, Consistent Carbohydrate      Discharge Diet:  Regular  Consistent Carbohydrate       [place]          Activity at Discharge:   Activity Instructions     Activity as Tolerated            Discharge Care Plan/Instructions:   1. Follow up with PCP in 1 week   2. Stop taking Ozempic and rosuvastatin   3. Return to the emergency department for new or worsening abdominal pain    Follow-up Appointments:   No future appointments.    Test Results Pending at Discharge: None    Electronically signed by MAYDA Don, 12/06/22, 08:04 CST.    Time: 35 minutes

## 2023-07-03 ENCOUNTER — HOSPITAL ENCOUNTER (OUTPATIENT)
Dept: ULTRASOUND IMAGING | Age: 58
Discharge: HOME | End: 2023-07-03
Payer: COMMERCIAL

## 2023-07-03 DIAGNOSIS — N18.32 STAGE 3B CHRONIC KIDNEY DISEASE (HCC): ICD-10-CM

## 2023-07-03 PROCEDURE — 76770 US EXAM ABDO BACK WALL COMP: CPT

## 2023-10-27 ENCOUNTER — APPOINTMENT (OUTPATIENT)
Dept: CT IMAGING | Age: 58
DRG: 378 | End: 2023-10-27

## 2023-10-27 ENCOUNTER — HOSPITAL ENCOUNTER (INPATIENT)
Age: 58
LOS: 5 days | Discharge: HOME | DRG: 378 | End: 2023-11-01
Attending: EMERGENCY MEDICINE | Admitting: STUDENT IN AN ORGANIZED HEALTH CARE EDUCATION/TRAINING PROGRAM
Payer: COMMERCIAL

## 2023-10-27 DIAGNOSIS — K92.2 GASTROINTESTINAL HEMORRHAGE, UNSPECIFIED GASTROINTESTINAL HEMORRHAGE TYPE: Primary | ICD-10-CM

## 2023-10-27 PROBLEM — I71.40 ABDOMINAL AORTIC ANEURYSM (AAA) WITHOUT RUPTURE (HCC): Status: ACTIVE | Noted: 2021-06-16

## 2023-10-27 PROBLEM — I10 ESSENTIAL HYPERTENSION: Status: ACTIVE | Noted: 2021-06-16

## 2023-10-27 PROBLEM — N18.4 STAGE 4 CHRONIC KIDNEY DISEASE (HCC): Status: ACTIVE | Noted: 2023-10-27

## 2023-10-27 LAB
ABO/RH: NORMAL
ALBUMIN SERPL-MCNC: 3.7 G/DL (ref 3.5–5.2)
ALP SERPL-CCNC: 70 U/L (ref 35–104)
ALT SERPL-CCNC: <5 U/L (ref 5–33)
ANION GAP SERPL CALCULATED.3IONS-SCNC: 14 MMOL/L (ref 7–19)
ANTIBODY SCREEN: NORMAL
AST SERPL-CCNC: 11 U/L (ref 5–32)
BASOPHILS # BLD: 0 K/UL (ref 0–0.2)
BASOPHILS NFR BLD: 0.5 % (ref 0–1)
BILIRUB SERPL-MCNC: <0.2 MG/DL (ref 0.2–1.2)
BUN SERPL-MCNC: 32 MG/DL (ref 6–20)
CALCIUM SERPL-MCNC: 8.7 MG/DL (ref 8.6–10)
CHLORIDE SERPL-SCNC: 102 MMOL/L (ref 98–111)
CO2 SERPL-SCNC: 21 MMOL/L (ref 22–29)
CREAT SERPL-MCNC: 1.8 MG/DL (ref 0.5–0.9)
EOSINOPHIL # BLD: 0.4 K/UL (ref 0–0.6)
EOSINOPHIL NFR BLD: 4.9 % (ref 0–5)
ERYTHROCYTE [DISTWIDTH] IN BLOOD BY AUTOMATED COUNT: 12.1 % (ref 11.5–14.5)
GLUCOSE SERPL-MCNC: 103 MG/DL (ref 74–109)
HCT VFR BLD AUTO: 28.1 % (ref 37–47)
HCT VFR BLD AUTO: 31.2 % (ref 37–47)
HCT VFR BLD AUTO: 36.8 % (ref 37–47)
HGB BLD-MCNC: 10.3 G/DL (ref 12–16)
HGB BLD-MCNC: 12.4 G/DL (ref 12–16)
HGB BLD-MCNC: 9.5 G/DL (ref 12–16)
IMM GRANULOCYTES # BLD: 0 K/UL
INR PPP: 1 (ref 0.88–1.18)
LYMPHOCYTES # BLD: 3.1 K/UL (ref 1.1–4.5)
LYMPHOCYTES NFR BLD: 35.4 % (ref 20–40)
MCH RBC QN AUTO: 31.6 PG (ref 27–31)
MCHC RBC AUTO-ENTMCNC: 33.7 G/DL (ref 33–37)
MCV RBC AUTO: 93.9 FL (ref 81–99)
MONOCYTES # BLD: 0.7 K/UL (ref 0–0.9)
MONOCYTES NFR BLD: 8.4 % (ref 0–10)
NEUTROPHILS # BLD: 4.3 K/UL (ref 1.5–7.5)
NEUTS SEG NFR BLD: 50.3 % (ref 50–65)
PLATELET # BLD AUTO: 306 K/UL (ref 130–400)
PMV BLD AUTO: 10.2 FL (ref 9.4–12.3)
POTASSIUM SERPL-SCNC: 4.9 MMOL/L (ref 3.5–5)
PROT SERPL-MCNC: 6.3 G/DL (ref 6.6–8.7)
PROTHROMBIN TIME: 12.9 SEC (ref 12–14.6)
RBC # BLD AUTO: 3.92 M/UL (ref 4.2–5.4)
REASON FOR REJECTION: NORMAL
REJECTED TEST: NORMAL
SODIUM SERPL-SCNC: 137 MMOL/L (ref 136–145)
TROPONIN T SERPL-MCNC: <0.01 NG/ML (ref 0–0.03)
WBC # BLD AUTO: 8.6 K/UL (ref 4.8–10.8)

## 2023-10-27 PROCEDURE — 0DJD8ZZ INSPECTION OF LOWER INTESTINAL TRACT, VIA NATURAL OR ARTIFICIAL OPENING ENDOSCOPIC: ICD-10-PCS | Performed by: INTERNAL MEDICINE

## 2023-10-27 PROCEDURE — 93005 ELECTROCARDIOGRAM TRACING: CPT | Performed by: EMERGENCY MEDICINE

## 2023-10-27 PROCEDURE — 86900 BLOOD TYPING SEROLOGIC ABO: CPT

## 2023-10-27 PROCEDURE — 80053 COMPREHEN METABOLIC PANEL: CPT

## 2023-10-27 PROCEDURE — 6360000002 HC RX W HCPCS: Performed by: NURSE PRACTITIONER

## 2023-10-27 PROCEDURE — 6360000002 HC RX W HCPCS: Performed by: EMERGENCY MEDICINE

## 2023-10-27 PROCEDURE — 99285 EMERGENCY DEPT VISIT HI MDM: CPT

## 2023-10-27 PROCEDURE — 2580000003 HC RX 258: Performed by: EMERGENCY MEDICINE

## 2023-10-27 PROCEDURE — 84484 ASSAY OF TROPONIN QUANT: CPT

## 2023-10-27 PROCEDURE — C9113 INJ PANTOPRAZOLE SODIUM, VIA: HCPCS | Performed by: NURSE PRACTITIONER

## 2023-10-27 PROCEDURE — 99253 IP/OBS CNSLTJ NEW/EST LOW 45: CPT | Performed by: SPECIALIST

## 2023-10-27 PROCEDURE — 85018 HEMOGLOBIN: CPT

## 2023-10-27 PROCEDURE — 86901 BLOOD TYPING SEROLOGIC RH(D): CPT

## 2023-10-27 PROCEDURE — 85025 COMPLETE CBC W/AUTO DIFF WBC: CPT

## 2023-10-27 PROCEDURE — 1210000000 HC MED SURG R&B

## 2023-10-27 PROCEDURE — 85610 PROTHROMBIN TIME: CPT

## 2023-10-27 PROCEDURE — 6370000000 HC RX 637 (ALT 250 FOR IP): Performed by: NURSE PRACTITIONER

## 2023-10-27 PROCEDURE — 85014 HEMATOCRIT: CPT

## 2023-10-27 PROCEDURE — 2580000003 HC RX 258: Performed by: NURSE PRACTITIONER

## 2023-10-27 PROCEDURE — 74176 CT ABD & PELVIS W/O CONTRAST: CPT

## 2023-10-27 PROCEDURE — 96374 THER/PROPH/DIAG INJ IV PUSH: CPT

## 2023-10-27 PROCEDURE — 36415 COLL VENOUS BLD VENIPUNCTURE: CPT

## 2023-10-27 PROCEDURE — 86850 RBC ANTIBODY SCREEN: CPT

## 2023-10-27 RX ORDER — LIRAGLUTIDE 6 MG/ML
1.8 INJECTION SUBCUTANEOUS EVERY OTHER DAY
COMMUNITY

## 2023-10-27 RX ORDER — ONDANSETRON 4 MG/1
4 TABLET, ORALLY DISINTEGRATING ORAL EVERY 8 HOURS PRN
Status: DISCONTINUED | OUTPATIENT
Start: 2023-10-27 | End: 2023-11-01 | Stop reason: HOSPADM

## 2023-10-27 RX ORDER — SODIUM CHLORIDE 9 MG/ML
INJECTION, SOLUTION INTRAVENOUS PRN
Status: DISCONTINUED | OUTPATIENT
Start: 2023-10-27 | End: 2023-11-01 | Stop reason: HOSPADM

## 2023-10-27 RX ORDER — DULOXETIN HYDROCHLORIDE 60 MG/1
1 CAPSULE, DELAYED RELEASE ORAL DAILY
COMMUNITY
Start: 2022-01-31

## 2023-10-27 RX ORDER — LISINOPRIL 40 MG/1
40 TABLET ORAL DAILY
COMMUNITY
Start: 2021-09-24

## 2023-10-27 RX ORDER — FUROSEMIDE 10 MG/ML
40 INJECTION INTRAMUSCULAR; INTRAVENOUS ONCE
Status: COMPLETED | OUTPATIENT
Start: 2023-10-27 | End: 2023-10-27

## 2023-10-27 RX ORDER — CARVEDILOL 3.12 MG/1
TABLET ORAL
COMMUNITY

## 2023-10-27 RX ORDER — SODIUM CHLORIDE 0.9 % (FLUSH) 0.9 %
5-40 SYRINGE (ML) INJECTION PRN
Status: DISCONTINUED | OUTPATIENT
Start: 2023-10-27 | End: 2023-11-01 | Stop reason: HOSPADM

## 2023-10-27 RX ORDER — TIZANIDINE 4 MG/1
4 TABLET ORAL EVERY 8 HOURS PRN
Status: DISCONTINUED | OUTPATIENT
Start: 2023-10-27 | End: 2023-10-30

## 2023-10-27 RX ORDER — SODIUM CHLORIDE 9 MG/ML
INJECTION, SOLUTION INTRAVENOUS CONTINUOUS
Status: DISCONTINUED | OUTPATIENT
Start: 2023-10-27 | End: 2023-10-31

## 2023-10-27 RX ORDER — ONDANSETRON 2 MG/ML
4 INJECTION INTRAMUSCULAR; INTRAVENOUS EVERY 6 HOURS PRN
Status: DISCONTINUED | OUTPATIENT
Start: 2023-10-27 | End: 2023-11-01 | Stop reason: HOSPADM

## 2023-10-27 RX ORDER — SODIUM BICARBONATE 650 MG/1
650 TABLET ORAL 3 TIMES DAILY
Status: DISCONTINUED | OUTPATIENT
Start: 2023-10-27 | End: 2023-11-01 | Stop reason: HOSPADM

## 2023-10-27 RX ORDER — SODIUM CHLORIDE 0.9 % (FLUSH) 0.9 %
5-40 SYRINGE (ML) INJECTION EVERY 12 HOURS SCHEDULED
Status: DISCONTINUED | OUTPATIENT
Start: 2023-10-27 | End: 2023-11-01 | Stop reason: HOSPADM

## 2023-10-27 RX ORDER — DULOXETIN HYDROCHLORIDE 60 MG/1
60 CAPSULE, DELAYED RELEASE ORAL DAILY
Status: DISCONTINUED | OUTPATIENT
Start: 2023-10-27 | End: 2023-11-01 | Stop reason: HOSPADM

## 2023-10-27 RX ORDER — TIZANIDINE 4 MG/1
TABLET ORAL
Status: ON HOLD | COMMUNITY
Start: 2022-04-21 | End: 2023-11-01 | Stop reason: HOSPADM

## 2023-10-27 RX ORDER — FUROSEMIDE 10 MG/ML
60 INJECTION INTRAMUSCULAR; INTRAVENOUS ONCE
Status: DISCONTINUED | OUTPATIENT
Start: 2023-10-27 | End: 2023-10-28

## 2023-10-27 RX ORDER — ONDANSETRON 4 MG/1
4 TABLET, ORALLY DISINTEGRATING ORAL EVERY 8 HOURS PRN
COMMUNITY
Start: 2015-09-06

## 2023-10-27 RX ORDER — CARVEDILOL 3.12 MG/1
3.12 TABLET ORAL 2 TIMES DAILY WITH MEALS
Status: DISCONTINUED | OUTPATIENT
Start: 2023-10-27 | End: 2023-11-01 | Stop reason: HOSPADM

## 2023-10-27 RX ORDER — OMEGA-3 FATTY ACIDS CAP DELAYED RELEASE 1000 MG 1000 MG
1000 CAPSULE DELAYED RELEASE ORAL 2 TIMES DAILY
Status: ON HOLD | COMMUNITY
End: 2023-11-01 | Stop reason: HOSPADM

## 2023-10-27 RX ORDER — SODIUM BICARBONATE 650 MG/1
TABLET ORAL
COMMUNITY
Start: 2022-07-11

## 2023-10-27 RX ORDER — TRAMADOL HYDROCHLORIDE 50 MG/1
TABLET ORAL
COMMUNITY
Start: 2022-04-21

## 2023-10-27 RX ORDER — TRAMADOL HYDROCHLORIDE 50 MG/1
50 TABLET ORAL EVERY 8 HOURS PRN
Status: DISCONTINUED | OUTPATIENT
Start: 2023-10-27 | End: 2023-11-01 | Stop reason: HOSPADM

## 2023-10-27 RX ORDER — ACETAMINOPHEN 650 MG/1
650 SUPPOSITORY RECTAL EVERY 6 HOURS PRN
Status: DISCONTINUED | OUTPATIENT
Start: 2023-10-27 | End: 2023-11-01 | Stop reason: HOSPADM

## 2023-10-27 RX ORDER — POLYETHYLENE GLYCOL 3350 17 G/17G
17 POWDER, FOR SOLUTION ORAL PRN
COMMUNITY

## 2023-10-27 RX ORDER — DULOXETIN HYDROCHLORIDE 30 MG/1
30 CAPSULE, DELAYED RELEASE ORAL NIGHTLY
COMMUNITY
Start: 2022-01-31

## 2023-10-27 RX ORDER — ACETAMINOPHEN 325 MG/1
650 TABLET ORAL EVERY 6 HOURS PRN
Status: DISCONTINUED | OUTPATIENT
Start: 2023-10-27 | End: 2023-11-01 | Stop reason: HOSPADM

## 2023-10-27 RX ORDER — 0.9 % SODIUM CHLORIDE 0.9 %
1000 INTRAVENOUS SOLUTION INTRAVENOUS ONCE
Status: COMPLETED | OUTPATIENT
Start: 2023-10-27 | End: 2023-10-27

## 2023-10-27 RX ORDER — DULOXETIN HYDROCHLORIDE 30 MG/1
30 CAPSULE, DELAYED RELEASE ORAL NIGHTLY
Status: DISCONTINUED | OUTPATIENT
Start: 2023-10-27 | End: 2023-11-01 | Stop reason: HOSPADM

## 2023-10-27 RX ADMIN — TIZANIDINE 4 MG: 4 TABLET ORAL at 21:28

## 2023-10-27 RX ADMIN — SODIUM CHLORIDE 1000 ML: 9 INJECTION, SOLUTION INTRAVENOUS at 07:59

## 2023-10-27 RX ADMIN — DULOXETINE HYDROCHLORIDE 30 MG: 30 CAPSULE, DELAYED RELEASE ORAL at 21:28

## 2023-10-27 RX ADMIN — FUROSEMIDE 40 MG: 10 INJECTION, SOLUTION INTRAMUSCULAR; INTRAVENOUS at 10:05

## 2023-10-27 RX ADMIN — SODIUM BICARBONATE 650 MG: 650 TABLET, ORALLY DISINTEGRATING ORAL at 21:29

## 2023-10-27 RX ADMIN — SODIUM BICARBONATE 650 MG: 650 TABLET, ORALLY DISINTEGRATING ORAL at 13:00

## 2023-10-27 RX ADMIN — SODIUM CHLORIDE: 9 INJECTION, SOLUTION INTRAVENOUS at 11:07

## 2023-10-27 RX ADMIN — TRAMADOL HYDROCHLORIDE 50 MG: 50 TABLET, COATED ORAL at 13:00

## 2023-10-27 RX ADMIN — DULOXETINE HYDROCHLORIDE 60 MG: 60 CAPSULE, DELAYED RELEASE ORAL at 13:00

## 2023-10-27 RX ADMIN — TRAMADOL HYDROCHLORIDE 50 MG: 50 TABLET, COATED ORAL at 21:28

## 2023-10-27 RX ADMIN — TIZANIDINE 4 MG: 4 TABLET ORAL at 13:00

## 2023-10-27 RX ADMIN — SODIUM CHLORIDE, PRESERVATIVE FREE 40 MG: 5 INJECTION INTRAVENOUS at 11:54

## 2023-10-27 ASSESSMENT — ENCOUNTER SYMPTOMS
SINUS PRESSURE: 0
SORE THROAT: 0
NAUSEA: 0
RECTAL PAIN: 0
BACK PAIN: 1
VOMITING: 0
SHORTNESS OF BREATH: 0
BLOOD IN STOOL: 1
DIARRHEA: 1
ABDOMINAL PAIN: 0
DIARRHEA: 0
RHINORRHEA: 0
CHEST TIGHTNESS: 0

## 2023-10-27 ASSESSMENT — PAIN DESCRIPTION - ORIENTATION
ORIENTATION: MID
ORIENTATION: LOWER

## 2023-10-27 ASSESSMENT — PAIN SCALES - GENERAL
PAINLEVEL_OUTOF10: 6
PAINLEVEL_OUTOF10: 5
PAINLEVEL_OUTOF10: 2
PAINLEVEL_OUTOF10: 5

## 2023-10-27 ASSESSMENT — PAIN DESCRIPTION - LOCATION
LOCATION: BACK
LOCATION: ABDOMEN

## 2023-10-27 ASSESSMENT — PAIN - FUNCTIONAL ASSESSMENT: PAIN_FUNCTIONAL_ASSESSMENT: 0-10

## 2023-10-27 ASSESSMENT — PAIN DESCRIPTION - DESCRIPTORS
DESCRIPTORS: ACHING
DESCRIPTORS: PRESSURE

## 2023-10-27 NOTE — CONSULTS
Consult Note              Today's date  10/27/2023      Hospital day # : 0      Patient August Ochs     YOB: 1965     Age:57 y.o. Inpatient consult to GI  Consult performed by: Lilia Abbott MD  Consult ordered by: BIANKA Ayala - CNP            Consult reason:    Lower GI bleed    Diverticulosis    History of colon resection-2017,     History of aspirin-acetaminophen-Caffeine    Chaperone, Nandini Ardon RN      Chief Complaint       Chief Complaint   Patient presents with    Rectal Bleeding     Since yesterday, bright red and dark blood \"pouring out\" per pt            History Obtained From       patient, electronic medical record    History of Present Illness     Patient is a 47-year-old lady. GI consulted for lower GI bleed. Patient has history of colon resection which may have been due to diverticulitis or bleeding in approximately 2017. Patient was admitted through the ER for lower GI bleeding. The patient was in his usual state of health until yesterday when she developed rectal bleeding. She has lower abdominal discomfort. She is feeling little weak. She denies syncope. She denies fever or chills. She denies pain in the rectum when bleeding. She denies family history ulcerative colitis, Crohn disease. Denies family history colon neoplasia. She does smoke. Denies nausea, vomiting, hematemesis, melena. Denies taking NSAIDs. Denies taking other blood thinners. Family history: She denies family history of cancers of esophagus, stomach, small intestine, colon, liver, gallbladder, pancreas. Denies personal family history of ulcerative colitis or Crohn's disease. Care everywhere: Documents: Previous encounters at [facility], (Rafi reyes, [facility], kidney specialist, [place] [facility], [address] No GI procedures. No GI office notes. 11/18/2022: Telephone contact with GI. Recall colon.     4/25/2022: Office visit gastro history of colon

## 2023-10-27 NOTE — ED PROVIDER NOTES
[facility]  eMERGENCY dEPARTMENT eNCOUnter      Pt Name: Madi Valles  MRN: [id number]  [address] 1965  Date of evaluation: 10/27/2023  Provider: Dalila Saez MD    CHIEF COMPLAINT       Chief Complaint   Patient presents with    Rectal Bleeding     Since yesterday, bright red and dark blood \"pouring out\" per pt         HISTORY OF PRESENT ILLNESS   (Location/Symptom, Timing/Onset,Context/Setting, Quality, Duration, Modifying Factors, Severity)  Note limiting factors. Madi Valles is a 59 y.o. female who presents to the emergency department painless maroon-colored rectal bleeding. HPI    Patient is a 49-year-old white female with history of chronic low back pain on tramadol; chronic kidney disease; degenerative disc disease; hypertension; thoracic and abdominal aneurysm measuring 3.7 cm noted in the past; prior colon resection for ruptured diverticuli and lower GI bleeding; who presents with maroonish colored GI bleeding initially in small amounts and then with clots. No fever. No pain. No lightheadedness or weakness. No fever or nausea or vomiting. NursingNotes were reviewed. REVIEW OF SYSTEMS    (2-9 systems for level 4, 10 or more for level 5)     Review of Systems   Constitutional:  Negative for chills, diaphoresis, fatigue and fever. HENT:  Negative for rhinorrhea, sinus pressure and sore throat. Eyes:  Negative for visual disturbance. Respiratory:  Negative for shortness of breath. Cardiovascular:  Negative for chest pain. Gastrointestinal:  Positive for blood in stool. Negative for abdominal pain, diarrhea, nausea and vomiting. Genitourinary:  Negative for difficulty urinating and dysuria. Musculoskeletal:  Negative for arthralgias and myalgias. Skin:  Negative for rash. Neurological:  Negative for dizziness and weakness. Psychiatric/Behavioral:  Negative for confusion. All other systems reviewed and are negative.            PAST MEDICALHISTORY     Past

## 2023-10-27 NOTE — H&P
[facility] - History & Physical      PCP: BIANKA Martinez    Date of Admission: 10/27/2023    Date of Service: 10/27/2023    Chief Complaint:  BRBPR    History Of Present Illness: The patient is a 59 y.o. female with past medical history of CKD stage IV, degenerative disc disease, hypertension, diverticular disease status post hemicolectomy who presented to [address] [facility] ED complaining of bright red blood per rectum. Patient reports yesterday afternoon she noticed a small smear of bright red blood however attributed to hemorrhoids. Patient reports this morning while getting out of bed she had incontinent stool. Patient reports stool was bright red blood with clotting. Patient reports total of 9 large volume bowel movements this morning. Patient denies abdominal pain. Patient reports small amount of lower abdominal pressure. Patient does not take blood thinners. patient denies nausea and vomiting. Patient reports she has had previous partial colectomy related to diverticulitis and perforation when living in [place]. In the ED patient was found to have creatinine 1.8, hemoglobin 12.4. CT abdomen pelvis no acute findings. GI was consulted from the ED and patient admitted to hospitalist service for further evaluation    Past Medical History:        Diagnosis Date    Chronic kidney disease     History of degenerative disc disease     Hypertension        Past Surgical History:        Procedure Laterality Date    APPENDECTOMY       SECTION      COLECTOMY      OVARIAN CYST REMOVAL      TONSILLECTOMY         Home Medications:  Prior to Admission medications    Medication Sig Start Date End Date Taking?  Authorizing Provider   DULoxetine (CYMBALTA) 60 MG extended release capsule Take 1 capsule by mouth daily 22  Yes Provider, MD Thais   traMADol (ULTRAM) 50 MG tablet 2 tabs Orally Three times daily 22  Yes Provider, MD Thais   tiZANidine (ZANAFLEX) 4 MG tablet

## 2023-10-28 PROBLEM — K92.2 GI BLEED: Status: ACTIVE | Noted: 2023-10-27

## 2023-10-28 LAB
ANION GAP SERPL CALCULATED.3IONS-SCNC: 12 MMOL/L (ref 7–19)
APTT PPP: 33.8 SEC (ref 26–36.2)
BASOPHILS # BLD: 0 K/UL (ref 0–0.2)
BASOPHILS NFR BLD: 0.4 % (ref 0–1)
BUN SERPL-MCNC: 31 MG/DL (ref 6–20)
CALCIUM SERPL-MCNC: 8.5 MG/DL (ref 8.6–10)
CHLORIDE SERPL-SCNC: 106 MMOL/L (ref 98–111)
CO2 SERPL-SCNC: 21 MMOL/L (ref 22–29)
CREAT SERPL-MCNC: 1.8 MG/DL (ref 0.5–0.9)
EOSINOPHIL # BLD: 0.3 K/UL (ref 0–0.6)
EOSINOPHIL NFR BLD: 4 % (ref 0–5)
ERYTHROCYTE [DISTWIDTH] IN BLOOD BY AUTOMATED COUNT: 12.2 % (ref 11.5–14.5)
GLUCOSE SERPL-MCNC: 99 MG/DL (ref 74–109)
HCT VFR BLD AUTO: 24.8 % (ref 37–47)
HCT VFR BLD AUTO: 27.4 % (ref 37–47)
HCT VFR BLD AUTO: 27.7 % (ref 37–47)
HCT VFR BLD AUTO: 27.8 % (ref 37–47)
HGB BLD-MCNC: 8.1 G/DL (ref 12–16)
HGB BLD-MCNC: 8.9 G/DL (ref 12–16)
HGB BLD-MCNC: 9 G/DL (ref 12–16)
HGB BLD-MCNC: 9.4 G/DL (ref 12–16)
IMM GRANULOCYTES # BLD: 0 K/UL
INR PPP: 1.11 (ref 0.88–1.18)
IRON SATN MFR SERPL: 42 % (ref 14–50)
IRON SERPL-MCNC: 93 UG/DL (ref 37–145)
LYMPHOCYTES # BLD: 2.3 K/UL (ref 1.1–4.5)
LYMPHOCYTES NFR BLD: 31.1 % (ref 20–40)
MCH RBC QN AUTO: 31.8 PG (ref 27–31)
MCHC RBC AUTO-ENTMCNC: 33.9 G/DL (ref 33–37)
MCV RBC AUTO: 93.6 FL (ref 81–99)
MONOCYTES # BLD: 0.6 K/UL (ref 0–0.9)
MONOCYTES NFR BLD: 7.5 % (ref 0–10)
NEUTROPHILS # BLD: 4.1 K/UL (ref 1.5–7.5)
NEUTS SEG NFR BLD: 56.6 % (ref 50–65)
PLATELET # BLD AUTO: 258 K/UL (ref 130–400)
PMV BLD AUTO: 10.1 FL (ref 9.4–12.3)
POTASSIUM SERPL-SCNC: 4.6 MMOL/L (ref 3.5–5)
PROTHROMBIN TIME: 14 SEC (ref 12–14.6)
RBC # BLD AUTO: 2.96 M/UL (ref 4.2–5.4)
SODIUM SERPL-SCNC: 139 MMOL/L (ref 136–145)
TIBC SERPL-MCNC: 220 UG/DL (ref 250–400)
WBC # BLD AUTO: 7.3 K/UL (ref 4.8–10.8)

## 2023-10-28 PROCEDURE — C9113 INJ PANTOPRAZOLE SODIUM, VIA: HCPCS | Performed by: NURSE PRACTITIONER

## 2023-10-28 PROCEDURE — 6360000002 HC RX W HCPCS: Performed by: NURSE PRACTITIONER

## 2023-10-28 PROCEDURE — 94760 N-INVAS EAR/PLS OXIMETRY 1: CPT

## 2023-10-28 PROCEDURE — 1210000000 HC MED SURG R&B

## 2023-10-28 PROCEDURE — 2580000003 HC RX 258: Performed by: NURSE PRACTITIONER

## 2023-10-28 PROCEDURE — 6370000000 HC RX 637 (ALT 250 FOR IP): Performed by: NURSE PRACTITIONER

## 2023-10-28 PROCEDURE — 85025 COMPLETE CBC W/AUTO DIFF WBC: CPT

## 2023-10-28 PROCEDURE — 85730 THROMBOPLASTIN TIME PARTIAL: CPT

## 2023-10-28 PROCEDURE — 85018 HEMOGLOBIN: CPT

## 2023-10-28 PROCEDURE — 85610 PROTHROMBIN TIME: CPT

## 2023-10-28 PROCEDURE — 80048 BASIC METABOLIC PNL TOTAL CA: CPT

## 2023-10-28 PROCEDURE — 36415 COLL VENOUS BLD VENIPUNCTURE: CPT

## 2023-10-28 PROCEDURE — 83550 IRON BINDING TEST: CPT

## 2023-10-28 PROCEDURE — 99232 SBSQ HOSP IP/OBS MODERATE 35: CPT | Performed by: INTERNAL MEDICINE

## 2023-10-28 PROCEDURE — 85014 HEMATOCRIT: CPT

## 2023-10-28 PROCEDURE — 83540 ASSAY OF IRON: CPT

## 2023-10-28 RX ORDER — BISACODYL 5 MG/1
20 TABLET, DELAYED RELEASE ORAL ONCE
Status: COMPLETED | OUTPATIENT
Start: 2023-10-29 | End: 2023-10-29

## 2023-10-28 RX ORDER — POLYETHYLENE GLYCOL 3350 17 G/17G
152 POWDER, FOR SOLUTION ORAL ONCE
Status: COMPLETED | OUTPATIENT
Start: 2023-10-29 | End: 2023-10-29

## 2023-10-28 RX ADMIN — CARVEDILOL 3.12 MG: 3.12 TABLET, FILM COATED ORAL at 17:42

## 2023-10-28 RX ADMIN — TRAMADOL HYDROCHLORIDE 50 MG: 50 TABLET, COATED ORAL at 21:27

## 2023-10-28 RX ADMIN — TIZANIDINE 4 MG: 4 TABLET ORAL at 09:37

## 2023-10-28 RX ADMIN — TIZANIDINE 4 MG: 4 TABLET ORAL at 21:27

## 2023-10-28 RX ADMIN — SODIUM CHLORIDE, PRESERVATIVE FREE 10 ML: 5 INJECTION INTRAVENOUS at 21:31

## 2023-10-28 RX ADMIN — SODIUM BICARBONATE 650 MG: 650 TABLET, ORALLY DISINTEGRATING ORAL at 13:50

## 2023-10-28 RX ADMIN — DULOXETINE HYDROCHLORIDE 30 MG: 30 CAPSULE, DELAYED RELEASE ORAL at 21:27

## 2023-10-28 RX ADMIN — SODIUM BICARBONATE 650 MG: 650 TABLET, ORALLY DISINTEGRATING ORAL at 21:27

## 2023-10-28 RX ADMIN — CARVEDILOL 3.12 MG: 3.12 TABLET, FILM COATED ORAL at 09:31

## 2023-10-28 RX ADMIN — SODIUM BICARBONATE 650 MG: 650 TABLET, ORALLY DISINTEGRATING ORAL at 09:31

## 2023-10-28 RX ADMIN — SODIUM CHLORIDE, PRESERVATIVE FREE 40 MG: 5 INJECTION INTRAVENOUS at 10:06

## 2023-10-28 RX ADMIN — TRAMADOL HYDROCHLORIDE 50 MG: 50 TABLET, COATED ORAL at 17:46

## 2023-10-28 RX ADMIN — TIZANIDINE 4 MG: 4 TABLET ORAL at 17:46

## 2023-10-28 RX ADMIN — TRAMADOL HYDROCHLORIDE 50 MG: 50 TABLET, COATED ORAL at 09:37

## 2023-10-28 RX ADMIN — DULOXETINE HYDROCHLORIDE 60 MG: 60 CAPSULE, DELAYED RELEASE ORAL at 09:31

## 2023-10-28 ASSESSMENT — ENCOUNTER SYMPTOMS
BACK PAIN: 1
RECTAL PAIN: 0
NAUSEA: 0
ABDOMINAL PAIN: 0
VOMITING: 0
SHORTNESS OF BREATH: 0
BLOOD IN STOOL: 1
CHEST TIGHTNESS: 0
DIARRHEA: 0

## 2023-10-28 ASSESSMENT — PAIN DESCRIPTION - LOCATION
LOCATION: BACK
LOCATION: BACK

## 2023-10-28 ASSESSMENT — PAIN SCALES - GENERAL
PAINLEVEL_OUTOF10: 5
PAINLEVEL_OUTOF10: 4
PAINLEVEL_OUTOF10: 5

## 2023-10-28 ASSESSMENT — PAIN DESCRIPTION - DESCRIPTORS: DESCRIPTORS: ACHING

## 2023-10-28 NOTE — PROGRESS NOTES
[facility] Hospitalists      Patient:  Andreia Degroot  YOB: 1965  Date of Service: 10/28/2023  MRN: [id number]   Acct: [de-identified]   Primary Care Physician: BIANKA Camarillo  Advance Directive: Full Code  Admit Date: 10/27/2023       Hospital Day: 1  Portions of this note have been copied forward, however, changed to reflect the most current clinical status of this patient. CHIEF COMPLAINT BRBPR    SUBJECTIVE:  Patient reports no further BMS since right after admission. She denies nausea and vomiting. CUMULATIVE HOSPITAL COURSE:  The patient is a 59 y.o. female with past medical history of CKD stage IV, degenerative disc disease, hypertension, diverticular disease status post hemicolectomy who presented to [address] [facility] ED complaining of bright red blood per rectum. Patient reports yesterday afternoon she noticed a small smear of bright red blood however attributed to hemorrhoids. Patient reports this morning while getting out of bed she had incontinent stool. Patient reports stool was bright red blood with clotting. Patient reports total of 9 large volume bowel movements this morning. Patient denies abdominal pain. Patient reports small amount of lower abdominal pressure. Patient does not take blood thinners. patient denies nausea and vomiting. Patient reports she has had previous partial colectomy related to diverticulitis and perforation when living in [place]. In the ED patient was found to have creatinine 1.8, hemoglobin 12.4. CT abdomen pelvis no acute findings. GI was consulted from the ED and patient admitted to hospitalist service for further evaluation. Patient was started on gentle IV hydration and Protonix IV. Patient had drop in hemoglobin from 12.4-9.0 this morning. GI is on board. Review of Systems:   Review of Systems   Constitutional:  Negative for activity change, appetite change, chills, fatigue and fever.    Respiratory:  Negative for chest tightness and shortness of pantoprazole (PROTONIX) 40 mg in sodium chloride (PF) 0.9 % 10 mL injection  40 mg IntraVENous Daily    carvedilol  3.125 mg Oral BID WC    DULoxetine  30 mg Oral Nightly    DULoxetine  60 mg Oral Daily    sodium bicarbonate  650 mg Oral TID     sodium chloride flush, sodium chloride, ondansetron **OR** ondansetron, acetaminophen **OR** acetaminophen, tiZANidine, traMADol  Diet NPO Exceptions are: Ice Chips, Sips of Water with Meds     Lab and other Data:     Recent Labs     10/27/23  0739 10/27/23  1613 10/27/23  2226 10/28/23  0427 10/28/23  1017   WBC 8.6  --   --  7.3  --    HGB 12.4   < > 9.5* 9.4* 9.0*     --   --  258  --     < > = values in this interval not displayed. Recent Labs     10/27/23  0838 10/28/23  0427    139   K 4.9 4.6    106   CO2 21* 21*   BUN 32* 31*   CREATININE 1.8* 1.8*   GLUCOSE 103 99     Recent Labs     10/27/23  0838   AST 11   ALT <5*   BILITOT <0.2   ALKPHOS 70     Troponin T:   Recent Labs     10/27/23  0838   TROPONINI <0.01     Pro-BNP: No results for input(s): \"BNP\" in the last 72 hours. INR:   Recent Labs     10/27/23  0739 10/28/23  0427   INR 1.00 1.11     UA:No results for input(s): \"NITRITE\", \"COLORU\", \"PHUR\", \"LABCAST\", \"WBCUA\", \"RBCUA\", \"MUCUS\", \"TRICHOMONAS\", \"YEAST\", \"BACTERIA\", \"CLARITYU\", \"SPECGRAV\", \"LEUKOCYTESUR\", \"UROBILINOGEN\", \"BILIRUBINUR\", \"BLOODU\", \"GLUCOSEU\", \"AMORPHOUS\" in the last 72 hours. Invalid input(s): \"KETONESU\"  A1C: No results for input(s): \"LABA1C\" in the last 72 hours. ABG:No results for input(s): \"PHART\", \"FMO9GAY\", \"PO2ART\", \"GYZ8UDY\", \"BEART\", \"HGBAE\", \"V8WTMYIT\", \"CARBOXHGBART\" in the last 72 hours. RAD:   CT ABDOMEN PELVIS WO CONTRAST Additional Contrast? None    Result Date: 10/27/2023  1. No evidence of an acute inflammatory process or abdominal hemorrhage. 2.  Diverticulosis without diverticulitis. 3.  Atherosclerotic, ectatic aorta.   All CT scans are performed using dose optimization techniques as

## 2023-10-28 NOTE — PROGRESS NOTES
Acute GI bleeding and mild hypogastric abdominal pain has subsided, afebrile, abdomen non-tender, baseline Hgb 12.4 gm down to 9.0 gm stabilizing, background history of chronic constipation, removal of multiple colon polyps and partial left colectomy for diverticulitis 2017, consider ischemic colitis verses diverticular bleed. Procedure, alternatives and risks discussed. Colonoscopy scheduled for Monday.

## 2023-10-29 LAB
ANION GAP SERPL CALCULATED.3IONS-SCNC: 11 MMOL/L (ref 7–19)
BASOPHILS # BLD: 0 K/UL (ref 0–0.2)
BASOPHILS NFR BLD: 0.3 % (ref 0–1)
BUN SERPL-MCNC: 22 MG/DL (ref 6–20)
CALCIUM SERPL-MCNC: 8.4 MG/DL (ref 8.6–10)
CHLORIDE SERPL-SCNC: 110 MMOL/L (ref 98–111)
CO2 SERPL-SCNC: 20 MMOL/L (ref 22–29)
CREAT SERPL-MCNC: 1.5 MG/DL (ref 0.5–0.9)
EOSINOPHIL # BLD: 0.3 K/UL (ref 0–0.6)
EOSINOPHIL NFR BLD: 4 % (ref 0–5)
ERYTHROCYTE [DISTWIDTH] IN BLOOD BY AUTOMATED COUNT: 12 % (ref 11.5–14.5)
GLUCOSE SERPL-MCNC: 100 MG/DL (ref 74–109)
HCT VFR BLD AUTO: 21.8 % (ref 37–47)
HCT VFR BLD AUTO: 24.6 % (ref 37–47)
HCT VFR BLD AUTO: 25 % (ref 37–47)
HCT VFR BLD AUTO: 29.2 % (ref 37–47)
HGB BLD-MCNC: 7.2 G/DL (ref 12–16)
HGB BLD-MCNC: 7.9 G/DL (ref 12–16)
HGB BLD-MCNC: 8.3 G/DL (ref 12–16)
HGB BLD-MCNC: 9.4 G/DL (ref 12–16)
IMM GRANULOCYTES # BLD: 0 K/UL
LYMPHOCYTES # BLD: 2.3 K/UL (ref 1.1–4.5)
LYMPHOCYTES NFR BLD: 34.4 % (ref 20–40)
MCH RBC QN AUTO: 31.1 PG (ref 27–31)
MCHC RBC AUTO-ENTMCNC: 33.2 G/DL (ref 33–37)
MCV RBC AUTO: 93.6 FL (ref 81–99)
MONOCYTES # BLD: 0.5 K/UL (ref 0–0.9)
MONOCYTES NFR BLD: 7.6 % (ref 0–10)
NEUTROPHILS # BLD: 3.6 K/UL (ref 1.5–7.5)
NEUTS SEG NFR BLD: 53.4 % (ref 50–65)
PLATELET # BLD AUTO: 210 K/UL (ref 130–400)
PMV BLD AUTO: 10 FL (ref 9.4–12.3)
POTASSIUM SERPL-SCNC: 4.5 MMOL/L (ref 3.5–5)
RBC # BLD AUTO: 2.67 M/UL (ref 4.2–5.4)
SODIUM SERPL-SCNC: 141 MMOL/L (ref 136–145)
WBC # BLD AUTO: 6.8 K/UL (ref 4.8–10.8)

## 2023-10-29 PROCEDURE — 6360000002 HC RX W HCPCS: Performed by: NURSE PRACTITIONER

## 2023-10-29 PROCEDURE — 94760 N-INVAS EAR/PLS OXIMETRY 1: CPT

## 2023-10-29 PROCEDURE — 85025 COMPLETE CBC W/AUTO DIFF WBC: CPT

## 2023-10-29 PROCEDURE — 80048 BASIC METABOLIC PNL TOTAL CA: CPT

## 2023-10-29 PROCEDURE — 2580000003 HC RX 258: Performed by: NURSE PRACTITIONER

## 2023-10-29 PROCEDURE — 6370000000 HC RX 637 (ALT 250 FOR IP): Performed by: NURSE PRACTITIONER

## 2023-10-29 PROCEDURE — 1210000000 HC MED SURG R&B

## 2023-10-29 PROCEDURE — 85014 HEMATOCRIT: CPT

## 2023-10-29 PROCEDURE — 36415 COLL VENOUS BLD VENIPUNCTURE: CPT

## 2023-10-29 PROCEDURE — 6370000000 HC RX 637 (ALT 250 FOR IP): Performed by: INTERNAL MEDICINE

## 2023-10-29 PROCEDURE — C9113 INJ PANTOPRAZOLE SODIUM, VIA: HCPCS | Performed by: NURSE PRACTITIONER

## 2023-10-29 PROCEDURE — 85018 HEMOGLOBIN: CPT

## 2023-10-29 RX ADMIN — SODIUM BICARBONATE 650 MG: 650 TABLET, ORALLY DISINTEGRATING ORAL at 14:53

## 2023-10-29 RX ADMIN — POLYETHYLENE GLYCOL 3350 152 G: 17 POWDER, FOR SOLUTION ORAL at 10:43

## 2023-10-29 RX ADMIN — DULOXETINE HYDROCHLORIDE 30 MG: 30 CAPSULE, DELAYED RELEASE ORAL at 20:51

## 2023-10-29 RX ADMIN — DULOXETINE HYDROCHLORIDE 60 MG: 60 CAPSULE, DELAYED RELEASE ORAL at 08:41

## 2023-10-29 RX ADMIN — BISACODYL 20 MG: 5 TABLET, COATED ORAL at 14:53

## 2023-10-29 RX ADMIN — ONDANSETRON 4 MG: 4 TABLET, ORALLY DISINTEGRATING ORAL at 16:17

## 2023-10-29 RX ADMIN — TIZANIDINE 4 MG: 4 TABLET ORAL at 20:51

## 2023-10-29 RX ADMIN — SODIUM CHLORIDE, PRESERVATIVE FREE 40 MG: 5 INJECTION INTRAVENOUS at 08:41

## 2023-10-29 RX ADMIN — TRAMADOL HYDROCHLORIDE 50 MG: 50 TABLET, COATED ORAL at 20:51

## 2023-10-29 RX ADMIN — TRAMADOL HYDROCHLORIDE 50 MG: 50 TABLET, COATED ORAL at 08:41

## 2023-10-29 RX ADMIN — SODIUM BICARBONATE 650 MG: 650 TABLET, ORALLY DISINTEGRATING ORAL at 08:41

## 2023-10-29 RX ADMIN — TIZANIDINE 4 MG: 4 TABLET ORAL at 08:41

## 2023-10-29 RX ADMIN — SODIUM BICARBONATE 650 MG: 650 TABLET, ORALLY DISINTEGRATING ORAL at 20:51

## 2023-10-29 ASSESSMENT — PAIN DESCRIPTION - DESCRIPTORS: DESCRIPTORS: ACHING

## 2023-10-29 ASSESSMENT — ENCOUNTER SYMPTOMS
DIARRHEA: 0
RECTAL PAIN: 0
ABDOMINAL PAIN: 0
BLOOD IN STOOL: 1
NAUSEA: 0
BACK PAIN: 1
CHEST TIGHTNESS: 0
VOMITING: 0
SHORTNESS OF BREATH: 0

## 2023-10-29 ASSESSMENT — PAIN DESCRIPTION - LOCATION: LOCATION: BACK

## 2023-10-29 ASSESSMENT — PAIN SCALES - GENERAL: PAINLEVEL_OUTOF10: 7

## 2023-10-29 NOTE — PROGRESS NOTES
[facility] Hospitalists      Patient:  Radha West  YOB: 1965  Date of Service: 10/29/2023  MRN: [id number]   Acct: [de-identified]   Primary Care Physician: BIANKA Burns  Advance Directive: Full Code  Admit Date: 10/27/2023       Hospital Day: 2  Portions of this note have been copied forward, however, changed to reflect the most current clinical status of this patient. CHIEF COMPLAINT BRBPR    SUBJECTIVE:  No new complaints, no further stools since admission    CUMULATIVE HOSPITAL COURSE:  The patient is a 59 y.o. female with past medical history of CKD stage IV, degenerative disc disease, hypertension, diverticular disease status post hemicolectomy who presented to [address] [facility] ED complaining of bright red blood per rectum. Patient reports yesterday afternoon she noticed a small smear of bright red blood however attributed to hemorrhoids. Patient reports this morning while getting out of bed she had incontinent stool. Patient reports stool was bright red blood with clotting. Patient reports total of 9 large volume bowel movements this morning. Patient denies abdominal pain. Patient reports small amount of lower abdominal pressure. Patient does not take blood thinners. patient denies nausea and vomiting. Patient reports she has had previous partial colectomy related to diverticulitis and perforation when living in [place]. In the ED patient was found to have creatinine 1.8, hemoglobin 12.4. CT abdomen pelvis no acute findings. GI was consulted from the ED and patient admitted to hospitalist service for further evaluation. Patient was started on gentle IV hydration and Protonix IV. Patient had drop in hemoglobin from 12.4-8.3. GI is on board, recommends colonoscopy tomorrow. Review of Systems:   Review of Systems   Constitutional:  Negative for activity change, appetite change, chills, fatigue and fever. Respiratory:  Negative for chest tightness and shortness of breath.     Cardiovascular: mL IntraVENous 2 times per day    pantoprazole (PROTONIX) 40 mg in sodium chloride (PF) 0.9 % 10 mL injection  40 mg IntraVENous Daily    [Held by provider] carvedilol  3.125 mg Oral BID WC    DULoxetine  30 mg Oral Nightly    DULoxetine  60 mg Oral Daily    sodium bicarbonate  650 mg Oral TID     sodium chloride flush, sodium chloride, ondansetron **OR** ondansetron, acetaminophen **OR** acetaminophen, tiZANidine, traMADol  ADULT DIET; Clear Liquid  ADULT ORAL NUTRITION SUPPLEMENT; Breakfast, Lunch, Dinner; Clear Liquid Oral Supplement  Diet NPO Exceptions are: Ice Chips, Sips of Water with Meds     Lab and other Data:     Recent Labs     10/27/23  0739 10/27/23  1613 10/28/23  0427 10/28/23  1017 10/28/23  1625 10/28/23  2227 10/29/23  0400   WBC 8.6  --  7.3  --   --   --  6.8   HGB 12.4   < > 9.4*   < > 8.9* 8.1* 8.3*     --  258  --   --   --  210    < > = values in this interval not displayed. Recent Labs     10/27/23  0838 10/28/23  0427 10/29/23  0400    139 141   K 4.9 4.6 4.5    106 110   CO2 21* 21* 20*   BUN 32* 31* 22*   CREATININE 1.8* 1.8* 1.5*   GLUCOSE 103 99 100       Recent Labs     10/27/23  0838   AST 11   ALT <5*   BILITOT <0.2   ALKPHOS 70       Troponin T:   Recent Labs     10/27/23  0838   TROPONINI <0.01       Pro-BNP: No results for input(s): \"BNP\" in the last 72 hours. INR:   Recent Labs     10/27/23  0739 10/28/23  0427   INR 1.00 1.11       UA:No results for input(s): \"NITRITE\", \"COLORU\", \"PHUR\", \"LABCAST\", \"WBCUA\", \"RBCUA\", \"MUCUS\", \"TRICHOMONAS\", \"YEAST\", \"BACTERIA\", \"CLARITYU\", \"SPECGRAV\", \"LEUKOCYTESUR\", \"UROBILINOGEN\", \"BILIRUBINUR\", \"BLOODU\", \"GLUCOSEU\", \"AMORPHOUS\" in the last 72 hours. Invalid input(s): \"KETONESU\"  A1C: No results for input(s): \"LABA1C\" in the last 72 hours. ABG:No results for input(s): \"PHART\", \"LPF0OQN\", \"PO2ART\", \"SUU9PHH\", \"BEART\", \"HGBAE\", \"V6FSFLYN\", \"CARBOXHGBART\" in the last 72 hours.     RAD:   CT ABDOMEN PELVIS WO CONTRAST

## 2023-10-30 ENCOUNTER — ANESTHESIA EVENT (OUTPATIENT)
Dept: ENDOSCOPY | Age: 58
DRG: 378 | End: 2023-10-30

## 2023-10-30 ENCOUNTER — ANESTHESIA (OUTPATIENT)
Dept: ENDOSCOPY | Age: 58
DRG: 378 | End: 2023-10-30

## 2023-10-30 LAB
ANION GAP SERPL CALCULATED.3IONS-SCNC: 13 MMOL/L (ref 7–19)
BASOPHILS # BLD: 0 K/UL (ref 0–0.2)
BASOPHILS NFR BLD: 0.2 % (ref 0–1)
BUN SERPL-MCNC: 15 MG/DL (ref 6–20)
CALCIUM SERPL-MCNC: 8.7 MG/DL (ref 8.6–10)
CHLORIDE SERPL-SCNC: 108 MMOL/L (ref 98–111)
CO2 SERPL-SCNC: 21 MMOL/L (ref 22–29)
CREAT SERPL-MCNC: 1.6 MG/DL (ref 0.5–0.9)
EKG P AXIS: 59 DEGREES
EKG P-R INTERVAL: 184 MS
EKG Q-T INTERVAL: 370 MS
EKG QRS DURATION: 84 MS
EKG QTC CALCULATION (BAZETT): 382 MS
EKG T AXIS: 50 DEGREES
EOSINOPHIL # BLD: 0.4 K/UL (ref 0–0.6)
EOSINOPHIL NFR BLD: 3.6 % (ref 0–5)
ERYTHROCYTE [DISTWIDTH] IN BLOOD BY AUTOMATED COUNT: 12.1 % (ref 11.5–14.5)
GLUCOSE SERPL-MCNC: 106 MG/DL (ref 74–109)
HCT VFR BLD AUTO: 23.2 % (ref 37–47)
HCT VFR BLD AUTO: 23.5 % (ref 37–47)
HGB BLD-MCNC: 7.5 G/DL (ref 12–16)
HGB BLD-MCNC: 7.9 G/DL (ref 12–16)
IMM GRANULOCYTES # BLD: 0 K/UL
LYMPHOCYTES # BLD: 2.7 K/UL (ref 1.1–4.5)
LYMPHOCYTES NFR BLD: 27.7 % (ref 20–40)
MCH RBC QN AUTO: 30.9 PG (ref 27–31)
MCHC RBC AUTO-ENTMCNC: 32.3 G/DL (ref 33–37)
MCV RBC AUTO: 95.5 FL (ref 81–99)
MONOCYTES # BLD: 0.8 K/UL (ref 0–0.9)
MONOCYTES NFR BLD: 7.8 % (ref 0–10)
NEUTROPHILS # BLD: 5.8 K/UL (ref 1.5–7.5)
NEUTS SEG NFR BLD: 60.3 % (ref 50–65)
PLATELET # BLD AUTO: 263 K/UL (ref 130–400)
PMV BLD AUTO: 10.6 FL (ref 9.4–12.3)
POTASSIUM SERPL-SCNC: 4.4 MMOL/L (ref 3.5–5)
RBC # BLD AUTO: 2.43 M/UL (ref 4.2–5.4)
SODIUM SERPL-SCNC: 142 MMOL/L (ref 136–145)
WBC # BLD AUTO: 9.7 K/UL (ref 4.8–10.8)

## 2023-10-30 PROCEDURE — 6360000002 HC RX W HCPCS: Performed by: NURSE ANESTHETIST, CERTIFIED REGISTERED

## 2023-10-30 PROCEDURE — 7100000001 HC PACU RECOVERY - ADDTL 15 MIN: Performed by: INTERNAL MEDICINE

## 2023-10-30 PROCEDURE — 3700000001 HC ADD 15 MINUTES (ANESTHESIA): Performed by: INTERNAL MEDICINE

## 2023-10-30 PROCEDURE — 6370000000 HC RX 637 (ALT 250 FOR IP): Performed by: INTERNAL MEDICINE

## 2023-10-30 PROCEDURE — 7100000000 HC PACU RECOVERY - FIRST 15 MIN: Performed by: INTERNAL MEDICINE

## 2023-10-30 PROCEDURE — 2580000003 HC RX 258: Performed by: NURSE PRACTITIONER

## 2023-10-30 PROCEDURE — 6360000002 HC RX W HCPCS: Performed by: INTERNAL MEDICINE

## 2023-10-30 PROCEDURE — 3609027000 HC COLONOSCOPY: Performed by: INTERNAL MEDICINE

## 2023-10-30 PROCEDURE — 85014 HEMATOCRIT: CPT

## 2023-10-30 PROCEDURE — 2580000003 HC RX 258: Performed by: INTERNAL MEDICINE

## 2023-10-30 PROCEDURE — 94760 N-INVAS EAR/PLS OXIMETRY 1: CPT

## 2023-10-30 PROCEDURE — 2709999900 HC NON-CHARGEABLE SUPPLY: Performed by: INTERNAL MEDICINE

## 2023-10-30 PROCEDURE — 36415 COLL VENOUS BLD VENIPUNCTURE: CPT

## 2023-10-30 PROCEDURE — C9113 INJ PANTOPRAZOLE SODIUM, VIA: HCPCS | Performed by: NURSE PRACTITIONER

## 2023-10-30 PROCEDURE — 85025 COMPLETE CBC W/AUTO DIFF WBC: CPT

## 2023-10-30 PROCEDURE — 1210000000 HC MED SURG R&B

## 2023-10-30 PROCEDURE — 93010 ELECTROCARDIOGRAM REPORT: CPT | Performed by: INTERNAL MEDICINE

## 2023-10-30 PROCEDURE — 2500000003 HC RX 250 WO HCPCS: Performed by: NURSE ANESTHETIST, CERTIFIED REGISTERED

## 2023-10-30 PROCEDURE — 0DJD8ZZ INSPECTION OF LOWER INTESTINAL TRACT, VIA NATURAL OR ARTIFICIAL OPENING ENDOSCOPIC: ICD-10-PCS | Performed by: INTERNAL MEDICINE

## 2023-10-30 PROCEDURE — 6360000002 HC RX W HCPCS: Performed by: NURSE PRACTITIONER

## 2023-10-30 PROCEDURE — 3700000000 HC ANESTHESIA ATTENDED CARE: Performed by: INTERNAL MEDICINE

## 2023-10-30 PROCEDURE — 80048 BASIC METABOLIC PNL TOTAL CA: CPT

## 2023-10-30 PROCEDURE — 85018 HEMOGLOBIN: CPT

## 2023-10-30 PROCEDURE — 45378 DIAGNOSTIC COLONOSCOPY: CPT | Performed by: INTERNAL MEDICINE

## 2023-10-30 RX ORDER — CIPROFLOXACIN 2 MG/ML
400 INJECTION, SOLUTION INTRAVENOUS EVERY 12 HOURS
Status: DISCONTINUED | OUTPATIENT
Start: 2023-10-30 | End: 2023-11-01 | Stop reason: HOSPADM

## 2023-10-30 RX ORDER — PROPOFOL 10 MG/ML
INJECTION, EMULSION INTRAVENOUS PRN
Status: DISCONTINUED | OUTPATIENT
Start: 2023-10-30 | End: 2023-10-30 | Stop reason: SDUPTHER

## 2023-10-30 RX ORDER — METOCLOPRAMIDE HYDROCHLORIDE 5 MG/ML
10 INJECTION INTRAMUSCULAR; INTRAVENOUS
Status: DISCONTINUED | OUTPATIENT
Start: 2023-10-30 | End: 2023-10-31 | Stop reason: HOSPADM

## 2023-10-30 RX ORDER — HYDROMORPHONE HYDROCHLORIDE 1 MG/ML
0.25 INJECTION, SOLUTION INTRAMUSCULAR; INTRAVENOUS; SUBCUTANEOUS EVERY 5 MIN PRN
Status: DISCONTINUED | OUTPATIENT
Start: 2023-10-30 | End: 2023-10-31 | Stop reason: HOSPADM

## 2023-10-30 RX ORDER — SODIUM CHLORIDE 0.9 % (FLUSH) 0.9 %
5-40 SYRINGE (ML) INJECTION EVERY 12 HOURS SCHEDULED
Status: DISCONTINUED | OUTPATIENT
Start: 2023-10-30 | End: 2023-10-31 | Stop reason: HOSPADM

## 2023-10-30 RX ORDER — SODIUM CHLORIDE 9 MG/ML
INJECTION, SOLUTION INTRAVENOUS PRN
Status: DISCONTINUED | OUTPATIENT
Start: 2023-10-30 | End: 2023-10-31 | Stop reason: HOSPADM

## 2023-10-30 RX ORDER — METRONIDAZOLE 500 MG/1
500 TABLET ORAL EVERY 8 HOURS SCHEDULED
Status: DISCONTINUED | OUTPATIENT
Start: 2023-10-30 | End: 2023-11-01 | Stop reason: HOSPADM

## 2023-10-30 RX ORDER — SODIUM CHLORIDE 0.9 % (FLUSH) 0.9 %
5-40 SYRINGE (ML) INJECTION PRN
Status: DISCONTINUED | OUTPATIENT
Start: 2023-10-30 | End: 2023-10-31 | Stop reason: HOSPADM

## 2023-10-30 RX ORDER — LIDOCAINE HYDROCHLORIDE 10 MG/ML
INJECTION, SOLUTION EPIDURAL; INFILTRATION; INTRACAUDAL; PERINEURAL PRN
Status: DISCONTINUED | OUTPATIENT
Start: 2023-10-30 | End: 2023-10-30 | Stop reason: SDUPTHER

## 2023-10-30 RX ADMIN — PROPOFOL 50 MG: 10 INJECTION, EMULSION INTRAVENOUS at 14:03

## 2023-10-30 RX ADMIN — SODIUM CHLORIDE: 9 INJECTION, SOLUTION INTRAVENOUS at 16:07

## 2023-10-30 RX ADMIN — METRONIDAZOLE 500 MG: 500 TABLET ORAL at 16:20

## 2023-10-30 RX ADMIN — SODIUM CHLORIDE, PRESERVATIVE FREE 10 ML: 5 INJECTION INTRAVENOUS at 01:39

## 2023-10-30 RX ADMIN — METRONIDAZOLE 500 MG: 500 TABLET ORAL at 21:52

## 2023-10-30 RX ADMIN — PROPOFOL 50 MG: 10 INJECTION, EMULSION INTRAVENOUS at 13:42

## 2023-10-30 RX ADMIN — PROPOFOL 100 MG: 10 INJECTION, EMULSION INTRAVENOUS at 14:00

## 2023-10-30 RX ADMIN — SODIUM CHLORIDE, PRESERVATIVE FREE 40 MG: 5 INJECTION INTRAVENOUS at 09:38

## 2023-10-30 RX ADMIN — LIDOCAINE HYDROCHLORIDE 5 ML: 10 INJECTION, SOLUTION EPIDURAL; INFILTRATION; INTRACAUDAL; PERINEURAL at 13:15

## 2023-10-30 RX ADMIN — PROPOFOL 50 MG: 10 INJECTION, EMULSION INTRAVENOUS at 13:20

## 2023-10-30 RX ADMIN — PROPOFOL 50 MG: 10 INJECTION, EMULSION INTRAVENOUS at 13:36

## 2023-10-30 RX ADMIN — PROPOFOL 50 MG: 10 INJECTION, EMULSION INTRAVENOUS at 13:30

## 2023-10-30 RX ADMIN — PROPOFOL 50 MG: 10 INJECTION, EMULSION INTRAVENOUS at 13:45

## 2023-10-30 RX ADMIN — PROPOFOL 50 MG: 10 INJECTION, EMULSION INTRAVENOUS at 13:23

## 2023-10-30 RX ADMIN — GLUCAGON HYDROCHLORIDE 0.5 MG: KIT at 13:28

## 2023-10-30 RX ADMIN — SODIUM BICARBONATE 650 MG: 650 TABLET, ORALLY DISINTEGRATING ORAL at 20:00

## 2023-10-30 RX ADMIN — PROPOFOL 50 MG: 10 INJECTION, EMULSION INTRAVENOUS at 13:39

## 2023-10-30 RX ADMIN — PROPOFOL 50 MG: 10 INJECTION, EMULSION INTRAVENOUS at 13:26

## 2023-10-30 RX ADMIN — PROPOFOL 50 MG: 10 INJECTION, EMULSION INTRAVENOUS at 13:17

## 2023-10-30 RX ADMIN — PROPOFOL 50 MG: 10 INJECTION, EMULSION INTRAVENOUS at 13:28

## 2023-10-30 RX ADMIN — TRAMADOL HYDROCHLORIDE 50 MG: 50 TABLET, COATED ORAL at 20:00

## 2023-10-30 RX ADMIN — SODIUM BICARBONATE 650 MG: 650 TABLET, ORALLY DISINTEGRATING ORAL at 16:20

## 2023-10-30 RX ADMIN — DULOXETINE HYDROCHLORIDE 30 MG: 30 CAPSULE, DELAYED RELEASE ORAL at 20:00

## 2023-10-30 RX ADMIN — SODIUM CHLORIDE, PRESERVATIVE FREE 10 ML: 5 INJECTION INTRAVENOUS at 09:40

## 2023-10-30 RX ADMIN — GLUCAGON HYDROCHLORIDE 0.5 MG: KIT at 13:49

## 2023-10-30 RX ADMIN — PROPOFOL 50 MG: 10 INJECTION, EMULSION INTRAVENOUS at 13:49

## 2023-10-30 RX ADMIN — PROPOFOL 100 MG: 10 INJECTION, EMULSION INTRAVENOUS at 13:33

## 2023-10-30 RX ADMIN — PROPOFOL 100 MG: 10 INJECTION, EMULSION INTRAVENOUS at 13:15

## 2023-10-30 RX ADMIN — CIPROFLOXACIN 400 MG: 2 INJECTION, SOLUTION INTRAVENOUS at 16:10

## 2023-10-30 ASSESSMENT — ENCOUNTER SYMPTOMS
NAUSEA: 0
VOMITING: 0
BACK PAIN: 1
BLOOD IN STOOL: 1
CHEST TIGHTNESS: 0
DIARRHEA: 0
RECTAL PAIN: 0
SHORTNESS OF BREATH: 0
ABDOMINAL PAIN: 0

## 2023-10-30 ASSESSMENT — PAIN SCALES - GENERAL
PAINLEVEL_OUTOF10: 7
PAINLEVEL_OUTOF10: 0

## 2023-10-30 ASSESSMENT — PAIN DESCRIPTION - LOCATION: LOCATION: BACK

## 2023-10-30 NOTE — PROGRESS NOTES
[facility] Hospitalists      Patient:  Madi Valles  YOB: 1965  Date of Service: 10/30/2023  MRN: [id number]   Acct: [de-identified]   Primary Care Physician: BIANKA Monteiro  Advance Directive: Full Code  Admit Date: 10/27/2023       Hospital Day: 3  Portions of this note have been copied forward, however, changed to reflect the most current clinical status of this patient. CHIEF COMPLAINT BRBPR    SUBJECTIVE:  She is anxious to have her colonoscopy. She states that she did have bright red stool during her     [address]:  The patient is a 59 y.o. female with past medical history of CKD stage IV, degenerative disc disease, hypertension, diverticular disease status post hemicolectomy who presented to [facility] ED on 10/27/2023 complaining of bright red blood per rectum. Patient reported the day prior to admission she noticed a small smear of bright red blood, however, attributed to hemorrhoids. Patient reported the morning of admission while getting out of bed she had incontinent stool. Patient reported stool was bright red blood with clotting. She stated that she had 9 large volume stools with blood clots prior to her arrival  She denied abdominal pain, nausea or vomiting, however, did admit to abdominal pressure to her lower abdomen. Patient does not take blood thinners. She reported she has had previous partial colectomy related to diverticulitis and perforation when living in [place]. In the ED patient was found to have creatinine 1.8, hemoglobin 12.4. CT abdomen pelvis no acute findings. GI was consulted from the ED and patient admitted to hospitalist service for further evaluation. Patient was started on gentle IV hydration and Protonix IV daily. Patient had drop in hemoglobin from 12.4-7.5 since her admission. GI is following and she is to have a colonoscopy later today. She does report that she has had ongoing bright red bleeding with BM's.    Creatinine remains relatively

## 2023-10-30 NOTE — ANESTHESIA PRE PROCEDURE
10/30/2023 04:20 AM     10/30/2023 04:20 AM    CO2 21 10/30/2023 04:20 AM    BUN 15 10/30/2023 04:20 AM    CREATININE 1.6 10/30/2023 04:20 AM    LABGLOM 37 10/30/2023 04:20 AM    GLUCOSE 106 10/30/2023 04:20 AM    PROT 6.3 10/27/2023 08:38 AM    CALCIUM 8.7 10/30/2023 04:20 AM    BILITOT <0.2 10/27/2023 08:38 AM    ALKPHOS 70 10/27/2023 08:38 AM    AST 11 10/27/2023 08:38 AM    ALT <5 10/27/2023 08:38 AM       POC Tests: No results for input(s): \"POCGLU\", \"POCNA\", \"POCK\", \"POCCL\", \"POCBUN\", \"POCHEMO\", \"POCHCT\" in the last 72 hours. Coags:   Lab Results   Component Value Date/Time    PROTIME 14.0 10/28/2023 04:27 AM    INR 1.11 10/28/2023 04:27 AM    APTT 33.8 10/28/2023 04:27 AM       HCG (If Applicable): No results found for: \"PREGTESTUR\", \"PREGSERUM\", \"HCG\", \"HCGQUANT\"     ABGs: No results found for: \"PHART\", \"PO2ART\", \"ADB2MGC\", \"REI1ZBX\", \"BEART\", \"O1ZYBIGH\"     Type & Screen (If Applicable):  No results found for: \"LABABO\", \"LABRH\"    Drug/Infectious Status (If Applicable):  No results found for: \"HIV\", \"HEPCAB\"    COVID-19 Screening (If Applicable): No results found for: \"COVID19\"        Anesthesia Evaluation   history of anesthetic complications:   Airway: Mallampati: II          Dental:          Pulmonary:normal exam                               Cardiovascular:    (+) hypertension:,         Rhythm: regular  Rate: normal                    Neuro/Psych:               GI/Hepatic/Renal:   (+) renal disease:,           Endo/Other:                     Abdominal:             Vascular: Other Findings:           Anesthesia Plan      general and TIVA     ASA 3       Induction: intravenous. Anesthetic plan and risks discussed with patient. Plan discussed with CRNA.                     BIANKA Leos - CRNA   10/30/2023

## 2023-10-30 NOTE — BRIEF OP NOTE
Brief Postoperative Note      Patient: Kim Duran  YOB: 1965  MRN: [id number]    Date of Procedure: 10/30/2023    Pre-Op Diagnosis Codes:     * GI bleed [K92.2]    Post-Op Diagnosis:   ongoing GI bleeding due to diverticular disease in left colon,      No ischemic colitis, no tumor or polyps  Procedure(s):  COLONOSCOPY DIAGNOSTIC    Surgeon(s):  Chika Gibson MD    Assistant:  Bethel Ku RN    Anesthesia: Monitor Anesthesia Care    Estimated Blood Loss (mL): 726 ml    Complications: none    Specimens:   * No specimens in log *    Implants:  * No implants in log *      Drains: * No LDAs found *    Findings: blood  accumulation only in left colon    Plan: empirical antibiotic therapy for diverticulitis, transfuse for Hgb<7 gm, surgical resection if bleeding more than 7 units.     Electronically signed by Chika Gibson MD on 10/30/2023 at 2:10 PM

## 2023-10-31 LAB
ABO/RH: NORMAL
ANION GAP SERPL CALCULATED.3IONS-SCNC: 11 MMOL/L (ref 7–19)
ANTIBODY SCREEN: NORMAL
BASOPHILS # BLD: 0 K/UL (ref 0–0.2)
BASOPHILS NFR BLD: 0.2 % (ref 0–1)
BLOOD BANK DISPENSE STATUS: NORMAL
BLOOD BANK PRODUCT CODE: NORMAL
BPU ID: NORMAL
BUN SERPL-MCNC: 12 MG/DL (ref 6–20)
CALCIUM SERPL-MCNC: 8.7 MG/DL (ref 8.6–10)
CHLORIDE SERPL-SCNC: 109 MMOL/L (ref 98–111)
CO2 SERPL-SCNC: 23 MMOL/L (ref 22–29)
CREAT SERPL-MCNC: 1.4 MG/DL (ref 0.5–0.9)
DESCRIPTION BLOOD BANK: NORMAL
EOSINOPHIL # BLD: 0.3 K/UL (ref 0–0.6)
EOSINOPHIL NFR BLD: 3 % (ref 0–5)
ERYTHROCYTE [DISTWIDTH] IN BLOOD BY AUTOMATED COUNT: 11.9 % (ref 11.5–14.5)
GLUCOSE SERPL-MCNC: 93 MG/DL (ref 74–109)
HCT VFR BLD AUTO: 20.1 % (ref 37–47)
HCT VFR BLD AUTO: 24.4 % (ref 37–47)
HGB BLD-MCNC: 6.6 G/DL (ref 12–16)
HGB BLD-MCNC: 8.2 G/DL (ref 12–16)
IMM GRANULOCYTES # BLD: 0.1 K/UL
LYMPHOCYTES # BLD: 2.2 K/UL (ref 1.1–4.5)
LYMPHOCYTES NFR BLD: 24.5 % (ref 20–40)
MCH RBC QN AUTO: 31 PG (ref 27–31)
MCHC RBC AUTO-ENTMCNC: 32.8 G/DL (ref 33–37)
MCV RBC AUTO: 94.4 FL (ref 81–99)
MONOCYTES # BLD: 0.7 K/UL (ref 0–0.9)
MONOCYTES NFR BLD: 8.1 % (ref 0–10)
NEUTROPHILS # BLD: 5.7 K/UL (ref 1.5–7.5)
NEUTS SEG NFR BLD: 63.6 % (ref 50–65)
PLATELET # BLD AUTO: 208 K/UL (ref 130–400)
PMV BLD AUTO: 10.8 FL (ref 9.4–12.3)
POTASSIUM SERPL-SCNC: 3.9 MMOL/L (ref 3.5–5)
RBC # BLD AUTO: 2.13 M/UL (ref 4.2–5.4)
SODIUM SERPL-SCNC: 143 MMOL/L (ref 136–145)
WBC # BLD AUTO: 8.9 K/UL (ref 4.8–10.8)

## 2023-10-31 PROCEDURE — 86900 BLOOD TYPING SEROLOGIC ABO: CPT

## 2023-10-31 PROCEDURE — 86901 BLOOD TYPING SEROLOGIC RH(D): CPT

## 2023-10-31 PROCEDURE — 1210000000 HC MED SURG R&B

## 2023-10-31 PROCEDURE — 30233N1 TRANSFUSION OF NONAUTOLOGOUS RED BLOOD CELLS INTO PERIPHERAL VEIN, PERCUTANEOUS APPROACH: ICD-10-PCS | Performed by: INTERNAL MEDICINE

## 2023-10-31 PROCEDURE — 6370000000 HC RX 637 (ALT 250 FOR IP): Performed by: INTERNAL MEDICINE

## 2023-10-31 PROCEDURE — 99231 SBSQ HOSP IP/OBS SF/LOW 25: CPT | Performed by: INTERNAL MEDICINE

## 2023-10-31 PROCEDURE — P9016 RBC LEUKOCYTES REDUCED: HCPCS

## 2023-10-31 PROCEDURE — 85025 COMPLETE CBC W/AUTO DIFF WBC: CPT

## 2023-10-31 PROCEDURE — C9113 INJ PANTOPRAZOLE SODIUM, VIA: HCPCS | Performed by: INTERNAL MEDICINE

## 2023-10-31 PROCEDURE — 85014 HEMATOCRIT: CPT

## 2023-10-31 PROCEDURE — 2580000003 HC RX 258: Performed by: NURSE ANESTHETIST, CERTIFIED REGISTERED

## 2023-10-31 PROCEDURE — 6360000002 HC RX W HCPCS: Performed by: INTERNAL MEDICINE

## 2023-10-31 PROCEDURE — 86850 RBC ANTIBODY SCREEN: CPT

## 2023-10-31 PROCEDURE — 94760 N-INVAS EAR/PLS OXIMETRY 1: CPT

## 2023-10-31 PROCEDURE — 80048 BASIC METABOLIC PNL TOTAL CA: CPT

## 2023-10-31 PROCEDURE — 86923 COMPATIBILITY TEST ELECTRIC: CPT

## 2023-10-31 PROCEDURE — 6370000000 HC RX 637 (ALT 250 FOR IP): Performed by: NURSE PRACTITIONER

## 2023-10-31 PROCEDURE — 85018 HEMOGLOBIN: CPT

## 2023-10-31 PROCEDURE — 2580000003 HC RX 258: Performed by: INTERNAL MEDICINE

## 2023-10-31 PROCEDURE — 36430 TRANSFUSION BLD/BLD COMPNT: CPT

## 2023-10-31 PROCEDURE — 36415 COLL VENOUS BLD VENIPUNCTURE: CPT

## 2023-10-31 RX ORDER — SODIUM CHLORIDE 9 MG/ML
INJECTION, SOLUTION INTRAVENOUS PRN
Status: DISCONTINUED | OUTPATIENT
Start: 2023-10-31 | End: 2023-11-01 | Stop reason: HOSPADM

## 2023-10-31 RX ORDER — PHYTONADIONE 5 MG/1
5 TABLET ORAL ONCE
Status: COMPLETED | OUTPATIENT
Start: 2023-10-31 | End: 2023-10-31

## 2023-10-31 RX ADMIN — TRAMADOL HYDROCHLORIDE 50 MG: 50 TABLET, COATED ORAL at 16:53

## 2023-10-31 RX ADMIN — METRONIDAZOLE 500 MG: 500 TABLET ORAL at 06:33

## 2023-10-31 RX ADMIN — SODIUM BICARBONATE 650 MG: 650 TABLET, ORALLY DISINTEGRATING ORAL at 20:24

## 2023-10-31 RX ADMIN — DULOXETINE HYDROCHLORIDE 30 MG: 30 CAPSULE, DELAYED RELEASE ORAL at 20:24

## 2023-10-31 RX ADMIN — METRONIDAZOLE 500 MG: 500 TABLET ORAL at 22:56

## 2023-10-31 RX ADMIN — METRONIDAZOLE 500 MG: 500 TABLET ORAL at 13:46

## 2023-10-31 RX ADMIN — CIPROFLOXACIN 400 MG: 2 INJECTION, SOLUTION INTRAVENOUS at 15:30

## 2023-10-31 RX ADMIN — PHYTONADIONE 5 MG: 5 TABLET ORAL at 18:14

## 2023-10-31 RX ADMIN — CARVEDILOL 3.12 MG: 3.12 TABLET, FILM COATED ORAL at 16:53

## 2023-10-31 RX ADMIN — SODIUM CHLORIDE, PRESERVATIVE FREE 10 ML: 5 INJECTION INTRAVENOUS at 08:59

## 2023-10-31 RX ADMIN — SODIUM CHLORIDE, PRESERVATIVE FREE 40 MG: 5 INJECTION INTRAVENOUS at 08:51

## 2023-10-31 RX ADMIN — SODIUM BICARBONATE 650 MG: 650 TABLET, ORALLY DISINTEGRATING ORAL at 13:46

## 2023-10-31 RX ADMIN — TRAMADOL HYDROCHLORIDE 50 MG: 50 TABLET, COATED ORAL at 08:50

## 2023-10-31 RX ADMIN — DULOXETINE HYDROCHLORIDE 60 MG: 60 CAPSULE, DELAYED RELEASE ORAL at 08:51

## 2023-10-31 RX ADMIN — SODIUM BICARBONATE 650 MG: 650 TABLET, ORALLY DISINTEGRATING ORAL at 08:58

## 2023-10-31 RX ADMIN — CIPROFLOXACIN 400 MG: 2 INJECTION, SOLUTION INTRAVENOUS at 03:07

## 2023-10-31 ASSESSMENT — ENCOUNTER SYMPTOMS
NAUSEA: 0
BLOOD IN STOOL: 1
CHEST TIGHTNESS: 0
RECTAL PAIN: 0
SHORTNESS OF BREATH: 0
ABDOMINAL PAIN: 0
BACK PAIN: 1
VOMITING: 0
DIARRHEA: 0

## 2023-10-31 ASSESSMENT — PAIN DESCRIPTION - DESCRIPTORS
DESCRIPTORS: ACHING
DESCRIPTORS: ACHING

## 2023-10-31 ASSESSMENT — PAIN DESCRIPTION - LOCATION
LOCATION: BACK
LOCATION: BACK

## 2023-10-31 ASSESSMENT — PAIN SCALES - GENERAL: PAINLEVEL_OUTOF10: 8

## 2023-10-31 NOTE — PROGRESS NOTES
No passage of blood rectally today, abdomen nontender, Hgb 6.6 corrected to 8.2 gm with one unit pack cells, advancing diet to full liquids at suppertime, discharge once Hgb has rising trend, will sign off, reconsult GI as needed, arrange for follow-up in GI clinic in 2 -3 weeks.

## 2023-10-31 NOTE — OP NOTE
[facility] [address], [address] [place] [address]                                OPERATIVE REPORT    PATIENT NAME: Frankie Soler                       :        1965  MED REC NO:   [id number]                              ROOM:       [facility]  ACCOUNT NO:   [de-identified]                           ADMIT DATE: 10/27/2023  PROVIDER:     Keren Lindo MD    DATE OF PROCEDURE:  10/30/2023    PROCEDURE PERFORMED:  Diagnostic colonoscopy to the cecum. INDICATION:  This is a 68-year-old female has a GI bleeding requiring  transfusion. She is undergoing colonoscopy to evaluate for a source to  help further guide management. PREMEDICATION:  Propofol per anesthetist.    PROCEDURE:  Gloved rectal exam revealed no bleeding from hemorrhoids and  no internal rectal mass palpable. The Olympus full-length video  colonoscope was inserted into the rectum and air insufflation completed. The rectal vault was empty with normal mucosal lining. The patient has  a history of a prior partial bowel resection, but no surgical  anastomosis was identified in the colon. There were multiple  large-mouth diverticula in the segment from 20 cm to 40 cm and then  smaller diverticula in the left colon. The majority of blood was  accumulating in the left side of the colon. Frequent lavage and  suctioning was done, but no single diverticula could be identified as a  focal source of bleeding. The scope was advanced under direct  visualization all the way to the cecum. The appendiceal orifice was  identified as well as the ileocecal valve. There was no new or old  blood in the cecum or a ascending colon segments. The scope was slowly  withdrawn with no colon tumor, polyps, AVM or ischemic colitis segment  at any level. No biopsies were indicated. Multiple photographs were  taken to show the diverticular disease. Scope was then removed from the  patient.   The patient tolerated the

## 2023-10-31 NOTE — PROGRESS NOTES
[facility] Hospitalists      Patient:  Stacy Abdul  YOB: 1965  Date of Service: 10/31/2023  MRN: [id number]   Acct: [de-identified]   Primary Care Physician: BIANKA May  Advance Directive: Full Code  Admit Date: 10/27/2023       Hospital Day: 4  Portions of this note have been copied forward, however, changed to reflect the most current clinical status of this patient. CHIEF COMPLAINT BRBPR    SUBJECTIVE:  She is currently receiving blood, she states that she is shocked that her blood level dropped enough to need blood. She denies abdominal pain. She denies any further evidence of BRBPR. She does state that she would like something more than clear liquids when able. CUMULATIVE HOSPITAL COURSE:  The patient is a 59 y.o. female with past medical history of CKD stage IV, degenerative disc disease, hypertension, diverticular disease status post hemicolectomy who presented to [facility] ED on 10/27/2023 complaining of bright red blood per rectum. Patient reported the day prior to admission she noticed a small smear of bright red blood, however, attributed to hemorrhoids. Patient reported the morning of admission while getting out of bed she had incontinent stool. Patient reported stool was bright red blood with clotting. She stated that she had 9 large volume stools with blood clots prior to her arrival  She denied abdominal pain, nausea or vomiting, however, did admit to abdominal pressure to her lower abdomen. Patient does not take blood thinners. She reported she has had previous partial colectomy related to diverticulitis and perforation when living in [place]. In the ED patient was found to have creatinine 1.8, hemoglobin 12.4. CT abdomen pelvis no acute findings. GI was consulted from the ED and patient admitted to hospitalist service for further evaluation. Patient was started on gentle IV hydration and Protonix IV daily.   Patient had drop in hemoglobin from 12.4-7.5 since her admission

## 2023-11-01 ENCOUNTER — TELEPHONE (OUTPATIENT)
Dept: GASTROENTEROLOGY | Age: 58
End: 2023-11-01

## 2023-11-01 VITALS
WEIGHT: 168 LBS | SYSTOLIC BLOOD PRESSURE: 142 MMHG | HEART RATE: 82 BPM | TEMPERATURE: 97.5 F | BODY MASS INDEX: 26.37 KG/M2 | RESPIRATION RATE: 20 BRPM | DIASTOLIC BLOOD PRESSURE: 82 MMHG | OXYGEN SATURATION: 100 % | HEIGHT: 67 IN

## 2023-11-01 LAB
ANION GAP SERPL CALCULATED.3IONS-SCNC: 11 MMOL/L (ref 7–19)
BASOPHILS # BLD: 0 K/UL (ref 0–0.2)
BASOPHILS NFR BLD: 0.3 % (ref 0–1)
BUN SERPL-MCNC: 11 MG/DL (ref 6–20)
CALCIUM SERPL-MCNC: 8.7 MG/DL (ref 8.6–10)
CHLORIDE SERPL-SCNC: 104 MMOL/L (ref 98–111)
CO2 SERPL-SCNC: 23 MMOL/L (ref 22–29)
CREAT SERPL-MCNC: 1.6 MG/DL (ref 0.5–0.9)
EOSINOPHIL # BLD: 0.4 K/UL (ref 0–0.6)
EOSINOPHIL NFR BLD: 4.2 % (ref 0–5)
ERYTHROCYTE [DISTWIDTH] IN BLOOD BY AUTOMATED COUNT: 13.4 % (ref 11.5–14.5)
GLUCOSE SERPL-MCNC: 105 MG/DL (ref 74–109)
HCT VFR BLD AUTO: 23.5 % (ref 37–47)
HCT VFR BLD AUTO: 28.2 % (ref 37–47)
HGB BLD-MCNC: 7.9 G/DL (ref 12–16)
HGB BLD-MCNC: 9.5 G/DL (ref 12–16)
IMM GRANULOCYTES # BLD: 0.1 K/UL
LYMPHOCYTES # BLD: 2.4 K/UL (ref 1.1–4.5)
LYMPHOCYTES NFR BLD: 27.6 % (ref 20–40)
MCH RBC QN AUTO: 30.7 PG (ref 27–31)
MCHC RBC AUTO-ENTMCNC: 33.6 G/DL (ref 33–37)
MCV RBC AUTO: 91.4 FL (ref 81–99)
MONOCYTES # BLD: 0.8 K/UL (ref 0–0.9)
MONOCYTES NFR BLD: 9.5 % (ref 0–10)
NEUTROPHILS # BLD: 5.1 K/UL (ref 1.5–7.5)
NEUTS SEG NFR BLD: 57.8 % (ref 50–65)
PLATELET # BLD AUTO: 212 K/UL (ref 130–400)
PMV BLD AUTO: 10.5 FL (ref 9.4–12.3)
POTASSIUM SERPL-SCNC: 3.9 MMOL/L (ref 3.5–5)
RBC # BLD AUTO: 2.57 M/UL (ref 4.2–5.4)
SODIUM SERPL-SCNC: 138 MMOL/L (ref 136–145)
WBC # BLD AUTO: 8.9 K/UL (ref 4.8–10.8)

## 2023-11-01 PROCEDURE — C9113 INJ PANTOPRAZOLE SODIUM, VIA: HCPCS | Performed by: INTERNAL MEDICINE

## 2023-11-01 PROCEDURE — 6370000000 HC RX 637 (ALT 250 FOR IP): Performed by: INTERNAL MEDICINE

## 2023-11-01 PROCEDURE — 85014 HEMATOCRIT: CPT

## 2023-11-01 PROCEDURE — 6360000002 HC RX W HCPCS: Performed by: INTERNAL MEDICINE

## 2023-11-01 PROCEDURE — 80048 BASIC METABOLIC PNL TOTAL CA: CPT

## 2023-11-01 PROCEDURE — 85018 HEMOGLOBIN: CPT

## 2023-11-01 PROCEDURE — 6370000000 HC RX 637 (ALT 250 FOR IP): Performed by: NURSE PRACTITIONER

## 2023-11-01 PROCEDURE — 85025 COMPLETE CBC W/AUTO DIFF WBC: CPT

## 2023-11-01 PROCEDURE — 36415 COLL VENOUS BLD VENIPUNCTURE: CPT

## 2023-11-01 PROCEDURE — 2580000003 HC RX 258: Performed by: INTERNAL MEDICINE

## 2023-11-01 RX ORDER — PANTOPRAZOLE SODIUM 40 MG/1
40 TABLET, DELAYED RELEASE ORAL
Qty: 30 TABLET | Refills: 0 | Status: SHIPPED | OUTPATIENT
Start: 2023-11-01

## 2023-11-01 RX ORDER — CIPROFLOXACIN 250 MG/1
250 TABLET, FILM COATED ORAL 2 TIMES DAILY
Qty: 10 TABLET | Refills: 0 | Status: SHIPPED | OUTPATIENT
Start: 2023-11-01 | End: 2023-11-06

## 2023-11-01 RX ORDER — METRONIDAZOLE 500 MG/1
500 TABLET ORAL EVERY 8 HOURS SCHEDULED
Qty: 30 TABLET | Refills: 0 | Status: SHIPPED | OUTPATIENT
Start: 2023-11-01 | End: 2023-11-11

## 2023-11-01 RX ADMIN — CARVEDILOL 3.12 MG: 3.12 TABLET, FILM COATED ORAL at 08:08

## 2023-11-01 RX ADMIN — SODIUM CHLORIDE, PRESERVATIVE FREE 40 MG: 5 INJECTION INTRAVENOUS at 08:09

## 2023-11-01 RX ADMIN — SODIUM BICARBONATE 650 MG: 650 TABLET, ORALLY DISINTEGRATING ORAL at 08:08

## 2023-11-01 RX ADMIN — CIPROFLOXACIN 400 MG: 2 INJECTION, SOLUTION INTRAVENOUS at 04:05

## 2023-11-01 RX ADMIN — METRONIDAZOLE 500 MG: 500 TABLET ORAL at 06:08

## 2023-11-01 RX ADMIN — DULOXETINE HYDROCHLORIDE 60 MG: 60 CAPSULE, DELAYED RELEASE ORAL at 08:07

## 2023-11-01 RX ADMIN — TRAMADOL HYDROCHLORIDE 50 MG: 50 TABLET, COATED ORAL at 01:00

## 2023-11-01 RX ADMIN — SODIUM CHLORIDE, PRESERVATIVE FREE 10 ML: 5 INJECTION INTRAVENOUS at 08:08

## 2023-11-01 ASSESSMENT — PAIN DESCRIPTION - ORIENTATION: ORIENTATION: MID

## 2023-11-01 ASSESSMENT — PAIN DESCRIPTION - LOCATION: LOCATION: BACK

## 2023-11-01 ASSESSMENT — PAIN SCALES - GENERAL: PAINLEVEL_OUTOF10: 6

## 2023-11-01 ASSESSMENT — PAIN DESCRIPTION - DESCRIPTORS: DESCRIPTORS: ACHING

## 2023-11-01 NOTE — TELEPHONE ENCOUNTER
Sanjuana Paulino called to schedule a  2-3 wk hfu . Pt being discharged 11/1. Please call pt to schedule    Please be advised that the best time to call her to accommodate their needs is Anytime.     Thank you.

## 2023-11-01 NOTE — DISCHARGE INSTR - DIET

## 2023-11-01 NOTE — DISCHARGE SUMMARY
Leslie Dominguez  :  1965  MRN:  [id number]    Admit date:  10/27/2023  Discharge date:  2023    Discharging Physician:  Dr. Romo     Advance Directive: Full Code    Consults: IP CONSULT TO GI  IP CONSULT TO GI     Primary Care Physician:  BIANKA Parker    Discharge Diagnoses:  Principal Problem:    Lower GI bleed  Active Problems:    Stage 4 chronic kidney disease ([address])  Resolved Problems:    * No resolved hospital problems. *      Portions of this note have been copied forward, however, changed to reflect the most current clinical status of this patient. Hospital Course: The patient is a 59 y.o. female with past medical history of CKD stage IV, degenerative disc disease, hypertension, diverticular disease status post hemicolectomy who presented to [facility] ED on 10/27/2023 complaining of bright red blood per rectum. Patient reported the day prior to admission she noticed a small smear of bright red blood, however, attributed to hemorrhoids. Patient reported the morning of admission while getting out of bed she had incontinent stool. Patient reported stool was bright red blood with clotting. She stated that she had 9 large volume stools with blood clots prior to her arrival  She denied abdominal pain, nausea or vomiting, however, did admit to abdominal pressure to her lower abdomen. Patient does not take blood thinners. She reported she has had previous partial colectomy related to diverticulitis and perforation when living in [place]. In the ED patient was found to have creatinine 1.8, hemoglobin 12.4. CT abdomen pelvis no acute findings. GI was consulted from the ED and patient admitted to hospitalist service for further evaluation. Patient was started on gentle IV hydration and Protonix IV daily. From her admission to the day of her colonoscopy her hemoglobin went from 12.4-to 7.5.    She underwent a colonoscopy per Dr. So Winters on 10/30/2023-no obvious source of bleeding, These medications were sent to [facility] [address], [address], [address] [place] [address] [facility]      Phone: [phone]   ciprofloxacin 250 MG tablet  metroNIDAZOLE 500 MG tablet  pantoprazole 40 MG tablet         Discharge Instructions: Follow up with BIANKA Daly within 1 week of discharge for hospital follow-up   Take medications as directed. Resume activity as tolerated. Diet: ADULT ORAL NUTRITION SUPPLEMENT; Breakfast, Lunch, Dinner; Clear Liquid Oral Supplement  ADULT DIET; Regular; GI Sterling (GERD/Peptic Ulcer)     Disposition: Patient is Stableand will be discharged to Home. Time spent on discharge 37 minutes spent in assessing patient, reviewing medications, discussion with nursing, confirming safe discharge plan and preparation of discharge summary.     Signed:  BIANKA Haley, 11/1/2023 2:15 PM

## (undated) DEVICE — ENDO KIT,LOURDES HOSPITAL: Brand: MEDLINE INDUSTRIES, INC.